# Patient Record
Sex: MALE | Race: WHITE | URBAN - METROPOLITAN AREA
[De-identification: names, ages, dates, MRNs, and addresses within clinical notes are randomized per-mention and may not be internally consistent; named-entity substitution may affect disease eponyms.]

---

## 2020-08-22 ENCOUNTER — NEW PATIENT (OUTPATIENT)
Dept: URBAN - METROPOLITAN AREA CLINIC 30 | Facility: CLINIC | Age: 50
End: 2020-08-22
Payer: COMMERCIAL

## 2020-08-22 DIAGNOSIS — H43.393 OTHER VITREOUS OPACITIES, BILATERAL: ICD-10-CM

## 2020-08-22 DIAGNOSIS — E11.39 TYPE 2 DIABETES MELLITUS WITH OPHTHALMIC COMPLICAT: Primary | ICD-10-CM

## 2020-08-22 DIAGNOSIS — Z79.84 LONG TERM (CURRENT) USE OF ORAL ANTIDIABETIC DRUGS: ICD-10-CM

## 2020-08-22 DIAGNOSIS — E11.3291 DIABETES MELLITUS TYPE 2 WITH MILD NON-PROLIFERATI: ICD-10-CM

## 2020-08-22 PROCEDURE — 92134 CPTRZ OPH DX IMG PST SGM RTA: CPT | Performed by: OPTOMETRIST

## 2020-08-22 PROCEDURE — 92004 COMPRE OPH EXAM NEW PT 1/>: CPT | Performed by: OPTOMETRIST

## 2020-08-22 ASSESSMENT — VISUAL ACUITY
OD: 20/20
OS: 20/20

## 2020-08-22 ASSESSMENT — KERATOMETRY
OD: 42.30
OS: 42.34

## 2020-08-22 ASSESSMENT — INTRAOCULAR PRESSURE
OS: 18
OD: 17

## 2024-02-09 DIAGNOSIS — Z00.00 HEALTH MAINTENANCE EXAMINATION: ICD-10-CM

## 2024-03-15 ENCOUNTER — LAB (OUTPATIENT)
Dept: LAB | Facility: LAB | Age: 54
End: 2024-03-15
Payer: COMMERCIAL

## 2024-03-15 DIAGNOSIS — E11.649 TYPE 2 DIABETES MELLITUS WITH HYPOGLYCEMIA WITHOUT COMA (MULTI): Primary | ICD-10-CM

## 2024-03-15 LAB
ALBUMIN SERPL BCP-MCNC: 3.5 G/DL (ref 3.4–5)
ALP SERPL-CCNC: 77 U/L (ref 33–120)
ALT SERPL W P-5'-P-CCNC: 17 U/L (ref 10–52)
ANION GAP SERPL CALC-SCNC: 13 MMOL/L (ref 10–20)
AST SERPL W P-5'-P-CCNC: 29 U/L (ref 9–39)
BILIRUB SERPL-MCNC: 1.5 MG/DL (ref 0–1.2)
BUN SERPL-MCNC: 16 MG/DL (ref 6–23)
CALCIUM SERPL-MCNC: 8.6 MG/DL (ref 8.6–10.3)
CHLORIDE SERPL-SCNC: 110 MMOL/L (ref 98–107)
CO2 SERPL-SCNC: 21 MMOL/L (ref 21–32)
CREAT SERPL-MCNC: 1.13 MG/DL (ref 0.5–1.3)
EGFRCR SERPLBLD CKD-EPI 2021: 78 ML/MIN/1.73M*2
EST. AVERAGE GLUCOSE BLD GHB EST-MCNC: 126 MG/DL
GLUCOSE SERPL-MCNC: 115 MG/DL (ref 74–99)
HBA1C MFR BLD: 6 %
POTASSIUM SERPL-SCNC: 4 MMOL/L (ref 3.5–5.3)
PROT SERPL-MCNC: 6.5 G/DL (ref 6.4–8.2)
PTH-INTACT SERPL-MCNC: 25.4 PG/ML (ref 18.5–88)
SODIUM SERPL-SCNC: 140 MMOL/L (ref 136–145)
TSH SERPL DL<=0.05 MIU/L-ACNC: 1.45 MIU/L (ref 0.27–4.2)

## 2024-03-15 PROCEDURE — 83970 ASSAY OF PARATHORMONE: CPT

## 2024-03-15 PROCEDURE — 80053 COMPREHEN METABOLIC PANEL: CPT

## 2024-03-15 PROCEDURE — 84443 ASSAY THYROID STIM HORMONE: CPT

## 2024-03-15 PROCEDURE — 36415 COLL VENOUS BLD VENIPUNCTURE: CPT

## 2024-03-15 PROCEDURE — 83036 HEMOGLOBIN GLYCOSYLATED A1C: CPT

## 2024-03-18 NOTE — PROGRESS NOTES
"Roberto Pierre is a 53 y.o. male presents to [facility] for stress management and resilience training in coordination with  Executive Health. Lives with wife.     Duties/Schedule:  Daily schedule: 6am to 6pm, plans to reduce how much time he's there, but new position. Not exercising, get home, dinner, relax, sleep.   Plan is to reduce work hours in the next 2 months.    Frequency of travel: 75% with small trips in between  Recently relocated from [place]. New position requires hiring/firing, which has been a source of stress.   What matters most to you?   Family (3 grown children)  Hobbies: music, writing, photography, reading (1 book/week) - helps him stay balanced    Current Self-Care/Stress Management Strategy:  What have you tried in the past?   Hobbies  What worked well for you?   Reading  Spending time with wife  Are there any stress management strategies you've seen that you are interested in learning more about?   Exercise, feels would be helpful but finds difficult with current schedule  Supplements/teas?   Peppermint tea and chamomile    Nourishment/Mindful Eating/Digestion:  Breakfast- quick/rush, standing in kitchen  Lunch-eats at desk while working, 1x/week cafeteria or out to eat  Dinner- sitting down to eat, no electronics  Recommended sitting for meals  No electronics, stressful conversations, news, etc. during mealtime  3 deep breaths, feel feet on floor  \"Breathing in I calm my body, breathing out, I smile\" x 3  Feel gratitude for your food  Feel the saliva pool in your mouth prior to taking the first bite.   Mindful eating improves overall digestion.     Caffeine/Alcohol  No alcohol  Occasional coke, no coffee     Restorative Sleep  Bedtime/Wake  Average duration: 6-7 hours, wakes well rested    Sleep onset/maintenance: Falls asleep easily, stays asleep  Nocturia: 0  Bedroom Temp: 72-73  Optimal temperature for sleep is 65 degrees Fahrenheit (18.3 degrees Celsius)  Light sources: Phone, night light  Electronics: " phone, ipad, glo (reads before sleep)  Recommended reducing blue light exposure  Gave up social media with COVID, continues, but does go on LinkedIn, tempted to turn off.    Medications/supplements for sleep?   None    Social Connection  Great marriage, involved in [employer], both traveling and recently moved. Making great connections at work.  Wants to start volunteering - enjoys the arts     Manifestation of Stress:   Body: Back pain- back tenses up. Occurs during difficult conversations.   Mental/Emotional: Become less patient, shorter fuse.       Assessment/Plan:   Mr. Pierre plans to increase exercise as his schedule allows by joining [employer] and swimming. He enjoys many hobbies and finds these to be an effective stress management strategy. He is at time eating while standing or doing other work and may consider breathing prior to eating and being fully present while eating.   Discussed physiologic changes that occur with acute vs. chronic stress, the autonomic nervous system, evidence re: neuroplasticity of mindfulness, and different mindfulness practices. Also discussed lifestyle habits that support the Body's natural defense mechanisms.  Practiced alternate nostril breathing together  Provided information on additional breathing exercises including 4:4 and 4:6 breathing.

## 2024-03-25 ENCOUNTER — HOSPITAL ENCOUNTER (OUTPATIENT)
Dept: RADIOLOGY | Facility: HOSPITAL | Age: 54
Discharge: HOME | End: 2024-03-25
Payer: COMMERCIAL

## 2024-03-25 ENCOUNTER — ALLIED HEALTH (OUTPATIENT)
Dept: INTEGRATIVE MEDICINE | Facility: CLINIC | Age: 54
End: 2024-03-25
Payer: COMMERCIAL

## 2024-03-25 ENCOUNTER — OFFICE VISIT (OUTPATIENT)
Dept: PRIMARY CARE | Facility: CLINIC | Age: 54
End: 2024-03-25
Payer: COMMERCIAL

## 2024-03-25 ENCOUNTER — HOSPITAL ENCOUNTER (OUTPATIENT)
Dept: VASCULAR MEDICINE | Facility: HOSPITAL | Age: 54
Discharge: HOME | End: 2024-03-25
Payer: COMMERCIAL

## 2024-03-25 ENCOUNTER — HOSPITAL ENCOUNTER (OUTPATIENT)
Dept: CARDIOLOGY | Facility: HOSPITAL | Age: 54
Discharge: HOME | End: 2024-03-25
Payer: COMMERCIAL

## 2024-03-25 VITALS
DIASTOLIC BLOOD PRESSURE: 68 MMHG | SYSTOLIC BLOOD PRESSURE: 132 MMHG | HEIGHT: 66 IN | WEIGHT: 170 LBS | OXYGEN SATURATION: 97 % | HEART RATE: 99 BPM | BODY MASS INDEX: 27.32 KG/M2

## 2024-03-25 DIAGNOSIS — Z00.00 HEALTH MAINTENANCE EXAMINATION: ICD-10-CM

## 2024-03-25 DIAGNOSIS — Z00.00 HEALTH MAINTENANCE EXAMINATION: Primary | ICD-10-CM

## 2024-03-25 DIAGNOSIS — R09.89 OTHER SPECIFIED SYMPTOMS AND SIGNS INVOLVING THE CIRCULATORY AND RESPIRATORY SYSTEMS: ICD-10-CM

## 2024-03-25 DIAGNOSIS — Z00.00 HEALTHCARE MAINTENANCE: Primary | ICD-10-CM

## 2024-03-25 DIAGNOSIS — K74.60 CIRRHOSIS OF LIVER WITHOUT ASCITES, UNSPECIFIED HEPATIC CIRRHOSIS TYPE (MULTI): ICD-10-CM

## 2024-03-25 DIAGNOSIS — Z13.6 SCREENING FOR AAA (AORTIC ABDOMINAL ANEURYSM): ICD-10-CM

## 2024-03-25 LAB
25(OH)D3 SERPL-MCNC: 35 NG/ML (ref 30–100)
ALBUMIN SERPL BCP-MCNC: 3.3 G/DL (ref 3.4–5)
ALP SERPL-CCNC: 71 U/L (ref 33–120)
ALT SERPL W P-5'-P-CCNC: 16 U/L (ref 10–52)
ANION GAP SERPL CALC-SCNC: 14 MMOL/L (ref 10–20)
AST SERPL W P-5'-P-CCNC: 27 U/L (ref 9–39)
BASOPHILS # BLD AUTO: 0.02 X10*3/UL (ref 0–0.1)
BASOPHILS NFR BLD AUTO: 0.3 %
BILIRUB SERPL-MCNC: 1.6 MG/DL (ref 0–1.2)
BUN SERPL-MCNC: 13 MG/DL (ref 6–23)
CALCIUM SERPL-MCNC: 7.8 MG/DL (ref 8.6–10.3)
CHLORIDE SERPL-SCNC: 112 MMOL/L (ref 98–107)
CHOLEST SERPL-MCNC: 130 MG/DL (ref 0–199)
CHOLESTEROL/HDL RATIO: 2.5
CO2 SERPL-SCNC: 20 MMOL/L (ref 21–32)
CREAT SERPL-MCNC: 1.12 MG/DL (ref 0.5–1.3)
CRP SERPL HS-MCNC: 4.1 MG/L
EGFRCR SERPLBLD CKD-EPI 2021: 79 ML/MIN/1.73M*2
EOSINOPHIL # BLD AUTO: 0.1 X10*3/UL (ref 0–0.7)
EOSINOPHIL NFR BLD AUTO: 1.7 %
ERYTHROCYTE [DISTWIDTH] IN BLOOD BY AUTOMATED COUNT: 14.8 % (ref 11.5–14.5)
EST. AVERAGE GLUCOSE BLD GHB EST-MCNC: 120 MG/DL
FERRITIN SERPL-MCNC: 28 NG/ML (ref 20–300)
GLUCOSE SERPL-MCNC: 103 MG/DL (ref 74–99)
HBA1C MFR BLD: 5.8 %
HCT VFR BLD AUTO: 40.5 % (ref 41–52)
HDLC SERPL-MCNC: 52.8 MG/DL
HGB BLD-MCNC: 13.3 G/DL (ref 13.5–17.5)
IMM GRANULOCYTES # BLD AUTO: 0.01 X10*3/UL (ref 0–0.7)
IMM GRANULOCYTES NFR BLD AUTO: 0.2 % (ref 0–0.9)
INSULIN P FAST SERPL-ACNC: 14 UIU/ML (ref 3–25)
IRON SATN MFR SERPL: 26 % (ref 25–45)
IRON SERPL-MCNC: 104 UG/DL (ref 35–150)
LDLC SERPL CALC-MCNC: 69 MG/DL
LYMPHOCYTES # BLD AUTO: 0.75 X10*3/UL (ref 1.2–4.8)
LYMPHOCYTES NFR BLD AUTO: 12.8 %
MAGNESIUM SERPL-MCNC: 1.8 MG/DL (ref 1.6–2.4)
MCH RBC QN AUTO: 29 PG (ref 26–34)
MCHC RBC AUTO-ENTMCNC: 32.8 G/DL (ref 32–36)
MCV RBC AUTO: 88 FL (ref 80–100)
MONOCYTES # BLD AUTO: 0.49 X10*3/UL (ref 0.1–1)
MONOCYTES NFR BLD AUTO: 8.4 %
NEUTROPHILS # BLD AUTO: 4.48 X10*3/UL (ref 1.2–7.7)
NEUTROPHILS NFR BLD AUTO: 76.6 %
NON HDL CHOLESTEROL: 77 MG/DL (ref 0–149)
NRBC BLD-RTO: 0 /100 WBCS (ref 0–0)
PLATELET # BLD AUTO: 64 X10*3/UL (ref 150–450)
POC APPEARANCE, URINE: CLEAR
POC BILIRUBIN, URINE: NEGATIVE
POC BLOOD, URINE: NEGATIVE
POC COLOR, URINE: YELLOW
POC GLUCOSE, URINE: ABNORMAL MG/DL
POC KETONES, URINE: NEGATIVE MG/DL
POC LEUKOCYTES, URINE: NEGATIVE
POC NITRITE,URINE: NEGATIVE
POC PH, URINE: 6.5 PH
POC PROTEIN, URINE: NEGATIVE MG/DL
POC SPECIFIC GRAVITY, URINE: 1.02
POC UROBILINOGEN, URINE: 0.2 EU/DL
POTASSIUM SERPL-SCNC: 4 MMOL/L (ref 3.5–5.3)
PROT SERPL-MCNC: 6.5 G/DL (ref 6.4–8.2)
PSA SERPL-MCNC: 1.15 NG/ML
RBC # BLD AUTO: 4.58 X10*6/UL (ref 4.5–5.9)
SODIUM SERPL-SCNC: 142 MMOL/L (ref 136–145)
TIBC SERPL-MCNC: 399 UG/DL (ref 240–445)
TRIGL SERPL-MCNC: 43 MG/DL (ref 0–149)
TSH SERPL-ACNC: 0.93 MIU/L (ref 0.44–3.98)
UIBC SERPL-MCNC: 295 UG/DL (ref 110–370)
URATE SERPL-MCNC: 3.2 MG/DL (ref 4–7.5)
VIT B12 SERPL-MCNC: 472 PG/ML (ref 211–911)
VLDL: 9 MG/DL (ref 0–40)
WBC # BLD AUTO: 5.9 X10*3/UL (ref 4.4–11.3)

## 2024-03-25 PROCEDURE — 76706 US ABDL AORTA SCREEN AAA: CPT

## 2024-03-25 PROCEDURE — 93018 CV STRESS TEST I&R ONLY: CPT | Performed by: INTERNAL MEDICINE

## 2024-03-25 PROCEDURE — 36415 COLL VENOUS BLD VENIPUNCTURE: CPT

## 2024-03-25 PROCEDURE — 83735 ASSAY OF MAGNESIUM: CPT

## 2024-03-25 PROCEDURE — EXAM4 EXAM 4: Performed by: INTERNAL MEDICINE

## 2024-03-25 PROCEDURE — 86141 C-REACTIVE PROTEIN HS: CPT

## 2024-03-25 PROCEDURE — 84153 ASSAY OF PSA TOTAL: CPT

## 2024-03-25 PROCEDURE — 81002 URINALYSIS NONAUTO W/O SCOPE: CPT | Performed by: INTERNAL MEDICINE

## 2024-03-25 PROCEDURE — 82306 VITAMIN D 25 HYDROXY: CPT

## 2024-03-25 PROCEDURE — 82728 ASSAY OF FERRITIN: CPT

## 2024-03-25 PROCEDURE — 85025 COMPLETE CBC W/AUTO DIFF WBC: CPT

## 2024-03-25 PROCEDURE — 83540 ASSAY OF IRON: CPT

## 2024-03-25 PROCEDURE — 93017 CV STRESS TEST TRACING ONLY: CPT

## 2024-03-25 PROCEDURE — 83036 HEMOGLOBIN GLYCOSYLATED A1C: CPT

## 2024-03-25 PROCEDURE — 83550 IRON BINDING TEST: CPT

## 2024-03-25 PROCEDURE — 71046 X-RAY EXAM CHEST 2 VIEWS: CPT

## 2024-03-25 PROCEDURE — 75571 CT HRT W/O DYE W/CA TEST: CPT

## 2024-03-25 PROCEDURE — 84550 ASSAY OF BLOOD/URIC ACID: CPT

## 2024-03-25 PROCEDURE — 84443 ASSAY THYROID STIM HORMONE: CPT

## 2024-03-25 PROCEDURE — 93880 EXTRACRANIAL BILAT STUDY: CPT

## 2024-03-25 PROCEDURE — 83525 ASSAY OF INSULIN: CPT

## 2024-03-25 PROCEDURE — 76706 US ABDL AORTA SCREEN AAA: CPT | Performed by: INTERNAL MEDICINE

## 2024-03-25 PROCEDURE — 82172 ASSAY OF APOLIPOPROTEIN: CPT

## 2024-03-25 PROCEDURE — 93016 CV STRESS TEST SUPVJ ONLY: CPT | Performed by: INTERNAL MEDICINE

## 2024-03-25 PROCEDURE — 93880 EXTRACRANIAL BILAT STUDY: CPT | Performed by: INTERNAL MEDICINE

## 2024-03-25 PROCEDURE — 84402 ASSAY OF FREE TESTOSTERONE: CPT

## 2024-03-25 PROCEDURE — 80053 COMPREHEN METABOLIC PANEL: CPT

## 2024-03-25 PROCEDURE — 80061 LIPID PANEL: CPT

## 2024-03-25 PROCEDURE — 82607 VITAMIN B-12: CPT

## 2024-03-25 RX ORDER — PANTOPRAZOLE SODIUM 40 MG/1
TABLET, DELAYED RELEASE ORAL
COMMUNITY
Start: 2024-01-02

## 2024-03-25 RX ORDER — DULAGLUTIDE 3 MG/.5ML
3 INJECTION, SOLUTION SUBCUTANEOUS
COMMUNITY
Start: 2024-03-01

## 2024-03-25 RX ORDER — EMPAGLIFLOZIN 25 MG/1
25 TABLET, FILM COATED ORAL DAILY
COMMUNITY
Start: 2024-03-01

## 2024-03-25 RX ORDER — FLASH GLUCOSE SENSOR
KIT MISCELLANEOUS
COMMUNITY
Start: 2023-05-11

## 2024-03-25 RX ORDER — FENOFIBRATE 145 MG/1
145 TABLET, FILM COATED ORAL DAILY
COMMUNITY
Start: 2024-02-28

## 2024-03-25 ASSESSMENT — PAIN SCALES - GENERAL: PAINLEVEL: 0-NO PAIN

## 2024-03-25 NOTE — PROGRESS NOTES
Executive Physical         Patient ID: Roberto Pierre is a 53 y.o. male who presents for Executive Evaluation:    The following report is in reference to your  executive physical examination which was held at [facility] on 03/25/24. Firstly, let me state that it was a pleasure meeting with you and that we appreciate you coming to [facility] for your executive evaluation.    At the time of your evaluation you were feeling well with no significant health concerns.    You were not complaining of fever ,chills, headache ,dizziness ,cough, chest pain shortness of breath, palpitations, nausea, vomiting, abdominal pain, loss of appetite, diarrhea, blood in the stools, frequent urination or painful urination.    Past Medical History:   Diagnosis Date    Cirrhosis of liver (CMS/HCC)     Diabetes (CMS/HCC)     Kidney stone          Review of Systems   Constitutional: Negative.    HENT: Negative.     Eyes: Negative.    Respiratory: Negative.     Cardiovascular: Negative.    Gastrointestinal: Negative.    Endocrine: Negative.    Genitourinary: Negative.    Skin: Negative.    Allergic/Immunologic: Negative.    Neurological: Negative.    Hematological: Negative.    Psychiatric/Behavioral: Negative.     All other systems reviewed and are negative.             Past Surgical History:   Procedure Laterality Date    TRIGGER FINGER RELEASE          Family History   Problem Relation Name Age of Onset    Cirrhosis Mother      Breast cancer Sister      Sleep apnea Brother          No Known Allergies       Current Outpatient Medications:     fenofibrate (Tricor) 145 mg tablet, Take 1 tablet (145 mg) by mouth once daily., Disp: , Rfl:     FreeStyle Malvin 2 Sensor kit, CHANGEEVERY 14 DAYS, Disp: , Rfl:     Jardiance 25 mg, Take 1 tablet (25 mg) by mouth once daily., Disp: , Rfl:     pantoprazole (ProtoNix) 40 mg EC tablet, TAKE 1 TAB BY MOUTH EVERY MORNING, Disp: , Rfl:      "Trulicity 3 mg/0.5 mL pen injector, Inject 3 mg under the skin 1 (one) time per week., Disp: , Rfl:      Visit Vitals  /68 (BP Location: Left arm, Patient Position: Sitting, BP Cuff Size: Large adult)   Pulse 99   Ht 1.676 m (5' 6\")   Wt 77.1 kg (170 lb)   SpO2 97%   BMI 27.44 kg/m²   Smoking Status Never   BSA 1.89 m²        Physical Exam  Constitutional:       Appearance: Normal appearance.   HENT:      Head: Normocephalic and atraumatic.      Right Ear: Tympanic membrane, ear canal and external ear normal.      Left Ear: Tympanic membrane, ear canal and external ear normal.      Nose: Nose normal.      Mouth/Throat:      Mouth: Mucous membranes are moist.   Eyes:      Extraocular Movements: Extraocular movements intact.      Conjunctiva/sclera: Conjunctivae normal.      Pupils: Pupils are equal, round, and reactive to light.   Cardiovascular:      Rate and Rhythm: Normal rate and regular rhythm.      Pulses: Normal pulses.      Heart sounds: Normal heart sounds.   Pulmonary:      Effort: Pulmonary effort is normal.      Breath sounds: Normal breath sounds.   Abdominal:      General: Abdomen is flat. Bowel sounds are normal.      Palpations: Abdomen is soft.      Comments: Variceal in abdomen and R groin region.   Genitourinary:     Rectum: Normal.   Musculoskeletal:         General: Normal range of motion.      Cervical back: Normal range of motion.   Skin:     General: Skin is warm.   Neurological:      General: No focal deficit present.      Mental Status: He is alert and oriented to person, place, and time.   Psychiatric:         Mood and Affect: Mood normal.         Behavior: Behavior normal.         Discussion/Summary  In summary you are 53 y.o. male with a past medical history of liver cirrhosis, DM and kidney stones. .  At the time of your evaluation you were feeling well with no significant health concerns.    Physical examination including blood pressure and cardiovascular examination was " unremarkable. Body mass index was elevated at 27.    Lab studies including  fasting lipid panel, thyroid function, Prostate specific antigen (PSA), Vitamin D, Vitamin B12, Insulin, Testosterone  and inflammatory markers were normal.      Low Platelets    Low Calcium/albumin levels.    Cardiology- Normal EKG, CT-Cardiac score, Exercise stress test, Carotid and Abdominal Aorta ultrasound studies.     The 10-year coronary artery disease risk is < 4% which is a very low risk for heart disease and strokes.     Elevated BMI= 27  ( 19-25) - Increase protein/fish/fiber/fruit/vegetables/water  whole grain intake  and limit processed/refined carbohydrates and added sugars. Increased physical activity to a minimum of 150 minutes of moderate exercise per week.      DM2- Recent weight loss of 30 pounds d/t portion control. Continue with Current Rx. HBA1C =6.0     Low platelets- d/t liver cirrhosis and portal hypertension. No platelet therapy is needed at this time. Please call if nose or gum bleeds occur or if uncontrolled bleeding episodes occur.    Liver cirrhosis- ? Familial ? Idiopathic. Recommend fibroscan and  hepatology consult at  for further evaluation & management.    Low albumin & calcium levels- most likely d/t liver cirrhosis and portal hypertension.    Skin - Please follow up with dermatology for annual examination.Please use a broad-spectrum sunscreen with an SPF of 30 or higher. Monitor moles for ABCDE ( asymmetry, border irregularity, color changes, diameter> pencil eraser and evolving )    Cancer screening- you are current with colonoscopy,prostate and lung cancer screening tests.    Vaccine- I would  recommend a shingles (Shingrix) vaccine at your earliest convenience.  In addition I would recommend a tetanus booster every 10 years to maintain immunity.  Recommend pneumonia vaccine (Prevnar 20)  at your earliest opportunity given your diagnosis of liver cirrhosis.  Consider COVID-19 booster and flu shot next  fall.      Wellness: Please continue with a balanced diet and a regular physical activity program for at least 150 minutes/week of moderate exercise and 30 minutes/week of resistance/weight training per week.  Try to get 6 to 8 hours of sleep per night.  Please download the calm or headspace brittani from the Brittani Store to assist with stress and sleep management if necessary.    In conclusion,  I wish to thank you for attending the [facility] health program at [facility].  I wish you and your family a safe and healthy Spring  season.    Please email me at [email or web address] or call me at [phone] if you have any questions pertaining to this report or any other medical concerns.    Be Well    Dr MARILYN Cedeno and Melinda Cabrera Master Clinician in Wellness    Senior Attending Physician , [facility] [place]    [facility]    Clinical     [facility] [employer]    [place], [place]    This note was partially generated using the Dragon voice recognition system.  There may be some incorrect wording ,grammar, spelling or punctuation errors that were not corrected prior to committing the note to the medical record.

## 2024-03-27 LAB — LPA SERPL-MCNC: 12 MG/DL

## 2024-03-31 LAB
TESTOSTERONE FREE (CHAN): 53.6 PG/ML (ref 35–155)
TESTOSTERONE,TOTAL,LC-MS/MS: 837 NG/DL (ref 250–1100)

## 2024-04-14 ASSESSMENT — ENCOUNTER SYMPTOMS
NEUROLOGICAL NEGATIVE: 1
PSYCHIATRIC NEGATIVE: 1
CONSTITUTIONAL NEGATIVE: 1
EYES NEGATIVE: 1
ALLERGIC/IMMUNOLOGIC NEGATIVE: 1
GASTROINTESTINAL NEGATIVE: 1
CARDIOVASCULAR NEGATIVE: 1
RESPIRATORY NEGATIVE: 1
ENDOCRINE NEGATIVE: 1
HEMATOLOGIC/LYMPHATIC NEGATIVE: 1

## 2024-06-17 ENCOUNTER — CLINICAL SUPPORT (OUTPATIENT)
Dept: GASTROENTEROLOGY | Facility: CLINIC | Age: 54
End: 2024-06-17
Payer: COMMERCIAL

## 2024-06-17 DIAGNOSIS — K74.60 CIRRHOSIS OF LIVER WITHOUT ASCITES, UNSPECIFIED HEPATIC CIRRHOSIS TYPE (MULTI): Primary | ICD-10-CM

## 2024-06-17 DIAGNOSIS — K74.60 CIRRHOSIS OF LIVER WITHOUT ASCITES, UNSPECIFIED HEPATIC CIRRHOSIS TYPE (MULTI): ICD-10-CM

## 2024-06-17 PROCEDURE — 91200 LIVER ELASTOGRAPHY: CPT | Performed by: INTERNAL MEDICINE

## 2024-08-27 PROBLEM — E11.649 TYPE 2 DIABETES MELLITUS WITH HYPOGLYCEMIA WITHOUT COMA (MULTI): Status: ACTIVE | Noted: 2024-03-15

## 2024-08-27 PROBLEM — R09.89 OTHER SPECIFIED SYMPTOMS AND SIGNS INVOLVING THE CIRCULATORY AND RESPIRATORY SYSTEMS: Status: ACTIVE | Noted: 2024-08-27

## 2024-09-05 ENCOUNTER — OFFICE VISIT (OUTPATIENT)
Dept: GASTROENTEROLOGY | Facility: CLINIC | Age: 54
End: 2024-09-05
Payer: COMMERCIAL

## 2024-09-05 VITALS
BODY MASS INDEX: 28.12 KG/M2 | TEMPERATURE: 97.5 F | HEART RATE: 80 BPM | WEIGHT: 175 LBS | HEIGHT: 66 IN | DIASTOLIC BLOOD PRESSURE: 68 MMHG | SYSTOLIC BLOOD PRESSURE: 131 MMHG | OXYGEN SATURATION: 99 %

## 2024-09-05 DIAGNOSIS — K74.60 CIRRHOSIS OF LIVER WITHOUT ASCITES, UNSPECIFIED HEPATIC CIRRHOSIS TYPE (MULTI): Primary | ICD-10-CM

## 2024-09-05 DIAGNOSIS — Z76.89 ENCOUNTER TO ESTABLISH CARE WITH NEW DOCTOR: ICD-10-CM

## 2024-09-05 DIAGNOSIS — K76.0 METABOLIC DYSFUNCTION-ASSOCIATED STEATOTIC LIVER DISEASE (MASLD): ICD-10-CM

## 2024-09-05 DIAGNOSIS — K76.6 PORTAL HYPERTENSION (MULTI): ICD-10-CM

## 2024-09-05 DIAGNOSIS — D69.6 THROMBOCYTOPENIA (CMS-HCC): ICD-10-CM

## 2024-09-05 PROCEDURE — 3048F LDL-C <100 MG/DL: CPT | Performed by: INTERNAL MEDICINE

## 2024-09-05 PROCEDURE — 3078F DIAST BP <80 MM HG: CPT | Performed by: INTERNAL MEDICINE

## 2024-09-05 PROCEDURE — 99205 OFFICE O/P NEW HI 60 MIN: CPT | Performed by: INTERNAL MEDICINE

## 2024-09-05 PROCEDURE — 3044F HG A1C LEVEL LT 7.0%: CPT | Performed by: INTERNAL MEDICINE

## 2024-09-05 PROCEDURE — 3075F SYST BP GE 130 - 139MM HG: CPT | Performed by: INTERNAL MEDICINE

## 2024-09-05 PROCEDURE — 99215 OFFICE O/P EST HI 40 MIN: CPT | Performed by: INTERNAL MEDICINE

## 2024-09-05 RX ORDER — CARVEDILOL 6.25 MG/1
6.25 TABLET ORAL
Qty: 60 TABLET | Refills: 11 | Status: SHIPPED | OUTPATIENT
Start: 2024-09-05 | End: 2025-09-05

## 2024-09-05 ASSESSMENT — PAIN SCALES - GENERAL: PAINLEVEL: 0-NO PAIN

## 2024-09-05 NOTE — PATIENT INSTRUCTIONS
Welcome to Dr. Felton Feldman's [facility].  Dr. Feldman sees patients at the following sites:  [place] [address] Liver/GI Clinic at [facility]  [address], [address] at [facility] at [facility], [facility] [address]    Dr. Feldman's hepatology care coordinator, Bettie HILLS, can be reached at [phone].  Dr. Feldman's , Darlene Sanchez, can be reached at [phone].     We are going to request records from your GI in [place].   I will review your records and determine if any additional blood work is needed.    Get a Liver Ultrasound.  Do not eat or drink anything 6 hours prior to your exam.  Call [phone] to schedule.     I am starting you on Carvedilol for Portal Hypertension.   Your dose will be 6.25 mg.   Start by taking 1 pill a day for the first week,   Please monitor your blood pressure and heart rate. Please call, or send a [employer] message with your BP/HR log.   If stable, then will increase to twice daily.    Follow up with me in clinic in 6 months.  Schedule your visit on your way out today. If unable, please call [phone] to schedule.

## 2024-09-05 NOTE — PROGRESS NOTES
"Subjective     Evaluation and management of cirrhosis.    History of Present Illness:   Roberto Pierre is a 53 y.o. male who presents to the [facility] at [facility] to establish his liver care.    He is referred by his primary care physician Dr. Amando Amaya.    Roberto or \"Anand\" moved to the [place] area last year.  He relocated from [place] with his wife, he was first diagnosed with a \"fatty liver\" in 2010 and cirrhosis in 2017.  He has been under the care of gastroenterologist in the past.    He explains that he was never surprised that he was diagnosed with liver disease.  He has a mother with a history of cirrhosis who underwent liver transplant in her 50s at the [facility] in [facility].  Her liver transplant was in 2003, over 20 years ago.  She is doing quite well.  Since her liver transplant she is also had a kidney transplant.    She also explains that he has 2 brothers -with fatty liver disease.  His older brother is also cirrhotic.    He denies any major complications from his liver disease.  He does recall having an endoscopy and banding of varices in the past.    He has never been jaundice.  He has never developed fluid overload symptoms or ascites.  He has never had any confusion symptoms.  He works in senior management at a high level.    Review of Systems  Review of Systems  Please refer to the new patient questionnaire for self-reported review of systems.    He has made a concerted effort to lose weight over the years.  He says it is he has lost about 30 pounds in total through diet and exercise.    Past Medical History   has a past medical history of Cirrhosis of liver (Multi), Diabetes (Multi), and Kidney stone.   Past medical history is significant for diabetes, which appears to be fairly well-controlled.  He takes Trulicity and Jardiance.    He also has mixed hyperlipidemia and takes fenofibrate for that.    He denies any surgical history.    I conducted a full medication " "review; denies any allergies to medications.    Social History   reports that he has never smoked. He does not have any smokeless tobacco history on file. He reports that he does not currently use alcohol.     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  He is .  He was previously living in [facility].    He denies cigarette smoking.  Denies alcohol use.  He denies any recreational drug use.    Family History  family history includes Breast cancer in his sister; Cirrhosis in his mother; Sleep apnea in his brother.     He has a significant family history of MASLD and cirrhosis as noted in the history of present illness.    Allergies  No Known Allergies    Medications  Current Outpatient Medications   Medication Instructions    fenofibrate (TRICOR) 145 mg, oral, Daily    FreeStyle Malvin 2 Sensor kit CHANGEEVERY 14 DAYS    Jardiance 25 mg, oral, Daily    pantoprazole (ProtoNix) 40 mg EC tablet TAKE 1 TAB BY MOUTH EVERY MORNING    Trulicity 3 mg, subcutaneous, Once Weekly        Objective   Visit Vitals  /68   Pulse 80   Temp 36.4 °C (97.5 °F)          3/25/2024     8:34 AM 9/5/2024     3:42 PM   Vitals   Systolic 132 131   Diastolic 68 68   Heart Rate 99 80   Temp  36.4 °C (97.5 °F)   Height (in) 1.676 m (5' 6\") 1.676 m (5' 6\")   Weight (lb) 170 175   BMI 27.44 kg/m2 28.25 kg/m2   BSA (m2) 1.89 m2 1.92 m2   Visit Report Report Report     Physical Exam  Vitals and nursing note reviewed.   Constitutional:       Appearance: Normal appearance.   HENT:      Head: Normocephalic and atraumatic.      Mouth/Throat:      Mouth: Mucous membranes are moist.      Pharynx: Oropharynx is clear.   Eyes:      General: No scleral icterus.     Extraocular Movements: Extraocular movements intact.      Pupils: Pupils are equal, round, and reactive to light.   Cardiovascular:      Rate and Rhythm: Normal rate and regular rhythm.      Heart sounds: No murmur heard.  Pulmonary:      " Effort: Pulmonary effort is normal.      Breath sounds: Normal breath sounds.   Abdominal:      General: Abdomen is flat. Bowel sounds are normal.      Palpations: Abdomen is soft. There is splenomegaly.      Comments: Hard liver edge.   Musculoskeletal:         General: No swelling. Normal range of motion.      Right lower leg: No edema.      Left lower leg: No edema.   Skin:     Coloration: Skin is not jaundiced.   Neurological:      General: No focal deficit present.      Mental Status: He is alert and oriented to person, place, and time. Mental status is at baseline.   Psychiatric:         Mood and Affect: Mood normal.         Thought Content: Thought content normal.         Judgment: Judgment normal.     Labs    WBC   Date/Time Value Ref Range Status   03/25/2024 09:08 AM 5.9 4.4 - 11.3 x10*3/uL Final     Hemoglobin   Date/Time Value Ref Range Status   03/25/2024 09:08 AM 13.3 (L) 13.5 - 17.5 g/dL Final     Hematocrit   Date/Time Value Ref Range Status   03/25/2024 09:08 AM 40.5 (L) 41.0 - 52.0 % Final     MCV   Date/Time Value Ref Range Status   03/25/2024 09:08 AM 88 80 - 100 fL Final     Platelets   Date/Time Value Ref Range Status   03/25/2024 09:08 AM 64 (L) 150 - 450 x10*3/uL Final     Comment:     Platelet count verified by smear review.        Total Protein   Date/Time Value Ref Range Status   03/25/2024 09:08 AM 6.5 6.4 - 8.2 g/dL Final     Albumin   Date/Time Value Ref Range Status   03/25/2024 09:08 AM 3.3 (L) 3.4 - 5.0 g/dL Final     AST   Date/Time Value Ref Range Status   03/25/2024 09:08 AM 27 9 - 39 U/L Final     ALT   Date/Time Value Ref Range Status   03/25/2024 09:08 AM 16 10 - 52 U/L Final     Comment:     Patients treated with Sulfasalazine may generate falsely decreased results for ALT.     Alkaline Phosphatase   Date/Time Value Ref Range Status   03/25/2024 09:08 AM 71 33 - 120 U/L Final     Bilirubin, Total   Date/Time Value Ref Range Status   03/25/2024 09:08 AM 1.6 (H) 0.0 - 1.2 mg/dL  Final        Vitamin D, 25-Hydroxy, Total   Date/Time Value Ref Range Status   03/25/2024 09:08 AM 35 30 - 100 ng/mL Final        AST   Date/Time Value Ref Range Status   03/25/2024 09:08 AM 27 9 - 39 U/L Final     ALT   Date/Time Value Ref Range Status   03/25/2024 09:08 AM 16 10 - 52 U/L Final     Comment:     Patients treated with Sulfasalazine may generate falsely decreased results for ALT.     Alkaline Phosphatase   Date/Time Value Ref Range Status   03/25/2024 09:08 AM 71 33 - 120 U/L Final     Bilirubin, Total   Date/Time Value Ref Range Status   03/25/2024 09:08 AM 1.6 (H) 0.0 - 1.2 mg/dL Final     Albumin   Date/Time Value Ref Range Status   03/25/2024 09:08 AM 3.3 (L) 3.4 - 5.0 g/dL Final     Total Protein   Date/Time Value Ref Range Status   03/25/2024 09:08 AM 6.5 6.4 - 8.2 g/dL Final     FIBROSCAN:     6/17/2024    Liver stiffness measurement of 27.5 kPa and a CAP score of 243  Assessment/Plan   Roberto Pierre is a 53 y.o. male who presents to hepatology clinic to establish his liver care.    He has MASLD and cirrhosis by history.    His liver function appears to be intact.  I do not have an INR to calculate his MELD score.  He appears to have compensated advanced chronic liver disease and cirrhosis.    He reports a history of esophageal varices this would be consistent with his thrombocytopenia and splenomegaly on physical exam as well as his FibroScan result of 27.5 kPa.    Plan:    Most importantly, I would like to review his prior records.  We will send off a records request to his former gastroenterologist Dr. Genaro August, [place].    Based on my review of his records I will determine if he needs any further workup for his liver disease and family history of liver disease.  I would favor at the very least sending off a hemochromatosis genetic test, alpha 1 antitrypsin genotyping/phenotyping and screening test for Dave's disease with a ceruloplasmin.    I have recommended liver cancer screening with  a liver ultrasound.  I have also recommended that he starts carvedilol for primary prevention against variceal bleeding and to prevent progression of his portal hypertension.  I believe this is indicated simply based on his low platelet count and elevated liver stiffness measurement.    He should follow my clinic every 6 months    Instructions      Felton Feldman MD

## 2024-09-05 NOTE — LETTER
"September 11, 2024     Amando Amaya MD  [address] [place], [facility] [address]  [facility] [address]    Patient: Roberto Pierre   YOB: 1970   Date of Visit: 9/5/2024       Dear Dr. Amando Amaya MD:    Thank you for referring Roberto Pierre to me for evaluation. Below are my notes for this consultation.  If you have questions, please do not hesitate to call me. I look forward to following your patient along with you.       Sincerely,     Felton Feldman MD      CC: No Recipients  ______________________________________________________________________________________    Subjective     Evaluation and management of cirrhosis.    History of Present Illness:   Roberto Pierre is a 53 y.o. male who presents to the [facility] at [facility] to establish his liver care.    He is referred by his primary care physician Dr. Amando Amaya.    Roberto or \"Anand\" moved to the [place] area last year.  He relocated from [place] with his wife, he was first diagnosed with a \"fatty liver\" in 2010 and cirrhosis in 2017.  He has been under the care of gastroenterologist in the past.    He explains that he was never surprised that he was diagnosed with liver disease.  He has a mother with a history of cirrhosis who underwent liver transplant in her 50s at the [facility] in [facility].  Her liver transplant was in 2003, over 20 years ago.  She is doing quite well.  Since her liver transplant she is also had a kidney transplant.    She also explains that he has 2 brothers -with fatty liver disease.  His older brother is also cirrhotic.    He denies any major complications from his liver disease.  He does recall having an endoscopy and banding of varices in the past.    He has never been jaundice.  He has never developed fluid overload symptoms or ascites.  He has never had any confusion symptoms.  He works in senior management at a high level.    Review of Systems  Review of Systems  Please refer to the " "new patient questionnaire for self-reported review of systems.    He has made a concerted effort to lose weight over the years.  He says it is he has lost about 30 pounds in total through diet and exercise.    Past Medical History   has a past medical history of Cirrhosis of liver (Multi), Diabetes (Multi), and Kidney stone.   Past medical history is significant for diabetes, which appears to be fairly well-controlled.  He takes Trulicity and Jardiance.    He also has mixed hyperlipidemia and takes fenofibrate for that.    He denies any surgical history.    I conducted a full medication review; denies any allergies to medications.    Social History   reports that he has never smoked. He does not have any smokeless tobacco history on file. He reports that he does not currently use alcohol.     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  He is .  He was previously living in [facility].    He denies cigarette smoking.  Denies alcohol use.  He denies any recreational drug use.    Family History  family history includes Breast cancer in his sister; Cirrhosis in his mother; Sleep apnea in his brother.     He has a significant family history of MASLD and cirrhosis as noted in the history of present illness.    Allergies  No Known Allergies    Medications  Current Outpatient Medications   Medication Instructions   • fenofibrate (TRICOR) 145 mg, oral, Daily   • FreeStyle Malvin 2 Sensor kit CHANGEEVERY 14 DAYS   • Jardiance 25 mg, oral, Daily   • pantoprazole (ProtoNix) 40 mg EC tablet TAKE 1 TAB BY MOUTH EVERY MORNING   • Trulicity 3 mg, subcutaneous, Once Weekly        Objective   Visit Vitals  /68   Pulse 80   Temp 36.4 °C (97.5 °F)          3/25/2024     8:34 AM 9/5/2024     3:42 PM   Vitals   Systolic 132 131   Diastolic 68 68   Heart Rate 99 80   Temp  36.4 °C (97.5 °F)   Height (in) 1.676 m (5' 6\") 1.676 m (5' 6\")   Weight (lb) 170 175   BMI 27.44 kg/m2 28.25 " kg/m2   BSA (m2) 1.89 m2 1.92 m2   Visit Report Report Report     Physical Exam  Vitals and nursing note reviewed.   Constitutional:       Appearance: Normal appearance.   HENT:      Head: Normocephalic and atraumatic.      Mouth/Throat:      Mouth: Mucous membranes are moist.      Pharynx: Oropharynx is clear.   Eyes:      General: No scleral icterus.     Extraocular Movements: Extraocular movements intact.      Pupils: Pupils are equal, round, and reactive to light.   Cardiovascular:      Rate and Rhythm: Normal rate and regular rhythm.      Heart sounds: No murmur heard.  Pulmonary:      Effort: Pulmonary effort is normal.      Breath sounds: Normal breath sounds.   Abdominal:      General: Abdomen is flat. Bowel sounds are normal.      Palpations: Abdomen is soft. There is splenomegaly.      Comments: Hard liver edge.   Musculoskeletal:         General: No swelling. Normal range of motion.      Right lower leg: No edema.      Left lower leg: No edema.   Skin:     Coloration: Skin is not jaundiced.   Neurological:      General: No focal deficit present.      Mental Status: He is alert and oriented to person, place, and time. Mental status is at baseline.   Psychiatric:         Mood and Affect: Mood normal.         Thought Content: Thought content normal.         Judgment: Judgment normal.     Labs    WBC   Date/Time Value Ref Range Status   03/25/2024 09:08 AM 5.9 4.4 - 11.3 x10*3/uL Final     Hemoglobin   Date/Time Value Ref Range Status   03/25/2024 09:08 AM 13.3 (L) 13.5 - 17.5 g/dL Final     Hematocrit   Date/Time Value Ref Range Status   03/25/2024 09:08 AM 40.5 (L) 41.0 - 52.0 % Final     MCV   Date/Time Value Ref Range Status   03/25/2024 09:08 AM 88 80 - 100 fL Final     Platelets   Date/Time Value Ref Range Status   03/25/2024 09:08 AM 64 (L) 150 - 450 x10*3/uL Final     Comment:     Platelet count verified by smear review.        Total Protein   Date/Time Value Ref Range Status   03/25/2024 09:08 AM 6.5  6.4 - 8.2 g/dL Final     Albumin   Date/Time Value Ref Range Status   03/25/2024 09:08 AM 3.3 (L) 3.4 - 5.0 g/dL Final     AST   Date/Time Value Ref Range Status   03/25/2024 09:08 AM 27 9 - 39 U/L Final     ALT   Date/Time Value Ref Range Status   03/25/2024 09:08 AM 16 10 - 52 U/L Final     Comment:     Patients treated with Sulfasalazine may generate falsely decreased results for ALT.     Alkaline Phosphatase   Date/Time Value Ref Range Status   03/25/2024 09:08 AM 71 33 - 120 U/L Final     Bilirubin, Total   Date/Time Value Ref Range Status   03/25/2024 09:08 AM 1.6 (H) 0.0 - 1.2 mg/dL Final        Vitamin D, 25-Hydroxy, Total   Date/Time Value Ref Range Status   03/25/2024 09:08 AM 35 30 - 100 ng/mL Final        AST   Date/Time Value Ref Range Status   03/25/2024 09:08 AM 27 9 - 39 U/L Final     ALT   Date/Time Value Ref Range Status   03/25/2024 09:08 AM 16 10 - 52 U/L Final     Comment:     Patients treated with Sulfasalazine may generate falsely decreased results for ALT.     Alkaline Phosphatase   Date/Time Value Ref Range Status   03/25/2024 09:08 AM 71 33 - 120 U/L Final     Bilirubin, Total   Date/Time Value Ref Range Status   03/25/2024 09:08 AM 1.6 (H) 0.0 - 1.2 mg/dL Final     Albumin   Date/Time Value Ref Range Status   03/25/2024 09:08 AM 3.3 (L) 3.4 - 5.0 g/dL Final     Total Protein   Date/Time Value Ref Range Status   03/25/2024 09:08 AM 6.5 6.4 - 8.2 g/dL Final     FIBROSCAN:     6/17/2024    Liver stiffness measurement of 27.5 kPa and a CAP score of 243  Assessment/Plan   Roberto Pierre is a 53 y.o. male who presents to hepatology clinic to establish his liver care.    He has MASLD and cirrhosis by history.    His liver function appears to be intact.  I do not have an INR to calculate his MELD score.  He appears to have compensated advanced chronic liver disease and cirrhosis.    He reports a history of esophageal varices this would be consistent with his thrombocytopenia and splenomegaly on  physical exam as well as his FibroScan result of 27.5 kPa.    Plan:    Most importantly, I would like to review his prior records.  We will send off a records request to his former gastroenterologist Dr. Genaro August, [place].    Based on my review of his records I will determine if he needs any further workup for his liver disease and family history of liver disease.  I would favor at the very least sending off a hemochromatosis genetic test, alpha 1 antitrypsin genotyping/phenotyping and screening test for Dave's disease with a ceruloplasmin.    I have recommended liver cancer screening with a liver ultrasound.  I have also recommended that he starts carvedilol for primary prevention against variceal bleeding and to prevent progression of his portal hypertension.  I believe this is indicated simply based on his low platelet count and elevated liver stiffness measurement.    He should follow my clinic every 6 months    Instructions      Felton Feldman MD

## 2024-09-06 ENCOUNTER — DOCUMENTATION (OUTPATIENT)
Dept: GASTROENTEROLOGY | Facility: HOSPITAL | Age: 54
End: 2024-09-06
Payer: COMMERCIAL

## 2024-09-06 NOTE — PROGRESS NOTES
Hepatology Nurse Coordinator Note  Sent JUAQUIN for patient to Lily, requesting for patient's GI records to be obtained for Dr. Feldman from:  Dr. Sidney HOLLAND August [facility].   [address] [place] [address] [address]  [place] [address]   [phone]  F: [phone]  All GI records.     Awaiting records.

## 2024-09-18 ENCOUNTER — HOSPITAL ENCOUNTER (OUTPATIENT)
Dept: RADIOLOGY | Facility: HOSPITAL | Age: 54
Discharge: HOME | End: 2024-09-18
Payer: COMMERCIAL

## 2024-09-18 DIAGNOSIS — K76.0 METABOLIC DYSFUNCTION-ASSOCIATED STEATOTIC LIVER DISEASE (MASLD): ICD-10-CM

## 2024-09-18 DIAGNOSIS — K76.6 PORTAL HYPERTENSION (MULTI): ICD-10-CM

## 2024-09-18 DIAGNOSIS — K74.60 CIRRHOSIS OF LIVER WITHOUT ASCITES, UNSPECIFIED HEPATIC CIRRHOSIS TYPE (MULTI): ICD-10-CM

## 2024-09-18 PROCEDURE — 76705 ECHO EXAM OF ABDOMEN: CPT | Performed by: RADIOLOGY

## 2024-09-18 PROCEDURE — 76705 ECHO EXAM OF ABDOMEN: CPT

## 2024-09-26 NOTE — PROGRESS NOTES
Patient is seen at the request of SELF for my opinion regarding diabetes. My final recommendations will be communicated back to the requesting provider by way of shared medical record.    Subjective   Roberto Pierre is a 53 y.o. male with a hx of type 2 diabetes, liver cirrhosis, kidney stones, who presents for management of diabetes.  Re-located from [place]. Was seeing an endocrinologist there.    Dx: approx 2012  HbA1c: 6.0% (10/3/2024), 5.8% (03/2024)  Current regimen: Jardiance 25 mg QDAY, Trulicity 3 mg/week  Past medications: glipizide, metformin, ozempic (insurance)  Complications: none    SMBG: does have a Malvin but has not been using    Hypoglycemia awareness: yes     Diet: 3 meals a day  Small breakfast (half a bagel or protein bar)  Lunch: sandwich  Dinner: left overs, frozen burritos    Exercise: not regularly    Today:  -intentionally lost 30 lbs on his own over the past year  -trying to lose more weight  -planning to start exercising; playing Mochila ball tonight    ROS  General: no fever or chills  CV: no chest pain   Respiratory: no shortness of breath  MSK: no lower extremity edema  Neuro: no headache or dizziness  See HPI for Endocrine ROS    Past Medical History:   Diagnosis Date    Cirrhosis of liver (Multi)     Diabetes (Multi)     Kidney stone        Past Surgical History:   Procedure Laterality Date    TRIGGER FINGER RELEASE         Social History     Socioeconomic History    Marital status: Unknown     Spouse name: Not on file    Number of children: Not on file    Years of education: Not on file    Highest education level: Not on file   Occupational History    Not on file   Tobacco Use    Smoking status: Never    Smokeless tobacco: Not on file   Vaping Use    Vaping status: Never Used   Substance and Sexual Activity    Alcohol use: Not Currently    Drug use: Not on file    Sexual activity: Not on file   Other Topics Concern    Not on file   Social History Narrative    Not on file     Social  "Determinants of Health     Financial Resource Strain: Not on file   Food Insecurity: Not on file   Transportation Needs: Not on file   Physical Activity: Not on file   Stress: Not on file   Social Connections: Not on file   Intimate Partner Violence: Not on file   Housing Stability: Not on file       Objective    Physical Exam  Blood pressure 138/68, pulse 78, temperature 36.3 °C (97.4 °F), temperature source Temporal, height 1.676 m (5' 6\"), weight 79.1 kg (174 lb 4.8 oz).  General: not in acute distress  HEENT: KARLY VALE  Thyroid: no goiter  Neuro: alert and oriented x 3  DIABETIC FOOT EXAM: 2+ pedal pulses bilaterally, 10/10 monofilament bilaterally, no open wounds or blisters    Current Outpatient Medications:     carvedilol (Coreg) 6.25 mg tablet, Take 1 tablet (6.25 mg) by mouth 2 times daily (morning and late afternoon). Take 1 tablet daily for 1 week. Then increase to twice daily., Disp: 60 tablet, Rfl: 11    fenofibrate (Tricor) 145 mg tablet, Take 1 tablet (145 mg) by mouth once daily., Disp: , Rfl:     pantoprazole (ProtoNix) 40 mg EC tablet, TAKE 1 TAB BY MOUTH EVERY MORNING, Disp: , Rfl:     Jardiance 25 mg, Take 1 tablet (25 mg) by mouth once daily., Disp: 90 tablet, Rfl: 3    OneTouch Delica Plus Lancet 33 gauge misc, Check blood glucose once daily, Disp: 100 each, Rfl: 3    OneTouch Ultra Test strip, Check blood glucose once daily, Disp: 100 strip, Rfl: 3    OneTouch Ultra2 Meter misc, Check blood glucose once a day, Disp: 1 each, Rfl: 0    [START ON 10/6/2024] tirzepatide (Mounjaro) 5 mg/0.5 mL pen injector, Inject 5 mg under the skin 1 (one) time per week., Disp: 6 mL, Rfl: 1    Assessment/Plan   Roberto Pierre is a 53 y.o. male with a hx of type 2 diabetes, liver cirrosis, kidney stones who presents for management of diabetes.    Type 2 diabetes mellitus  HbA1c: 6.0% (10/30/2024), 5.8% (03/2024)  Current regimen: Jardiance 25 mg QDAY, Trulicity 3 mg/week  Eye exam:  2 years ago  Urine microalbumin: " due  Podiatry: foot exam done today 10/3/2024  Lipids: HDL 53, LDL 69, TG 43 (03/2024) not on statin    A1c: 6.0% today. Excellent glycemic control.  Reports that he has been on Jardiance and Trulicity for a few years now.  Used to be on Ozempic but had to switch to Trulicity due to insurance coverage change (in [place]).  Was only on a low dose of Ozempic, so does not know if it made much impact on his weight.  He is interested in trying Mounjaro to help with further weight loss.  If Mounjaro is not covered, will try Ozempic. If Ozemic is not covered either, will increase Trulicity to 4.5 mg dose.  Was using a Malvin 2 previously, but did not like it because of the alarms.  He prefers to do fingersticks. Will send glucose meter and supplies.      PLAN:  -start Mounjaro 5 mg, once a week  -stop Trulicity  -continue Jardiance 25 mg QDAY  -check blood glucose once a day (alternate fasting and 2h post dinner)   -due for eye exam (provided referral today)  -check urine microalbumin    Follow up in 6 months   Uses [employer].

## 2024-10-03 ENCOUNTER — APPOINTMENT (OUTPATIENT)
Dept: ENDOCRINOLOGY | Facility: CLINIC | Age: 54
End: 2024-10-03
Payer: COMMERCIAL

## 2024-10-03 VITALS
BODY MASS INDEX: 28.01 KG/M2 | TEMPERATURE: 97.4 F | HEART RATE: 78 BPM | SYSTOLIC BLOOD PRESSURE: 138 MMHG | HEIGHT: 66 IN | WEIGHT: 174.3 LBS | DIASTOLIC BLOOD PRESSURE: 68 MMHG

## 2024-10-03 DIAGNOSIS — E11.649 TYPE 2 DIABETES MELLITUS WITH HYPOGLYCEMIA WITHOUT COMA, UNSPECIFIED WHETHER LONG TERM INSULIN USE: ICD-10-CM

## 2024-10-03 DIAGNOSIS — R09.89 OTHER SPECIFIED SYMPTOMS AND SIGNS INVOLVING THE CIRCULATORY AND RESPIRATORY SYSTEMS: ICD-10-CM

## 2024-10-03 DIAGNOSIS — E11.65 TYPE 2 DIABETES MELLITUS WITH HYPERGLYCEMIA, WITHOUT LONG-TERM CURRENT USE OF INSULIN: Primary | ICD-10-CM

## 2024-10-03 LAB — POC HEMOGLOBIN A1C: 6 % (ref 4.2–6.5)

## 2024-10-03 PROCEDURE — 3008F BODY MASS INDEX DOCD: CPT | Performed by: STUDENT IN AN ORGANIZED HEALTH CARE EDUCATION/TRAINING PROGRAM

## 2024-10-03 PROCEDURE — 99204 OFFICE O/P NEW MOD 45 MIN: CPT | Performed by: STUDENT IN AN ORGANIZED HEALTH CARE EDUCATION/TRAINING PROGRAM

## 2024-10-03 PROCEDURE — 3078F DIAST BP <80 MM HG: CPT | Performed by: STUDENT IN AN ORGANIZED HEALTH CARE EDUCATION/TRAINING PROGRAM

## 2024-10-03 PROCEDURE — 3075F SYST BP GE 130 - 139MM HG: CPT | Performed by: STUDENT IN AN ORGANIZED HEALTH CARE EDUCATION/TRAINING PROGRAM

## 2024-10-03 PROCEDURE — 83036 HEMOGLOBIN GLYCOSYLATED A1C: CPT | Performed by: STUDENT IN AN ORGANIZED HEALTH CARE EDUCATION/TRAINING PROGRAM

## 2024-10-03 PROCEDURE — 3044F HG A1C LEVEL LT 7.0%: CPT | Performed by: STUDENT IN AN ORGANIZED HEALTH CARE EDUCATION/TRAINING PROGRAM

## 2024-10-03 PROCEDURE — 3048F LDL-C <100 MG/DL: CPT | Performed by: STUDENT IN AN ORGANIZED HEALTH CARE EDUCATION/TRAINING PROGRAM

## 2024-10-03 RX ORDER — LANCETS 33 GAUGE
EACH MISCELLANEOUS
Qty: 100 EACH | Refills: 3 | Status: SHIPPED | OUTPATIENT
Start: 2024-10-03

## 2024-10-03 RX ORDER — EMPAGLIFLOZIN 25 MG/1
25 TABLET, FILM COATED ORAL DAILY
Qty: 90 TABLET | Refills: 3 | Status: SHIPPED | OUTPATIENT
Start: 2024-10-03

## 2024-10-03 RX ORDER — TIRZEPATIDE 5 MG/.5ML
5 INJECTION, SOLUTION SUBCUTANEOUS
Qty: 6 ML | Refills: 1 | Status: SHIPPED | OUTPATIENT
Start: 2024-10-06

## 2024-10-03 RX ORDER — LANCETS 30 GAUGE
EACH MISCELLANEOUS
Qty: 1 EACH | Refills: 0 | Status: SHIPPED | OUTPATIENT
Start: 2024-10-03

## 2024-10-03 RX ORDER — BLOOD SUGAR DIAGNOSTIC
STRIP MISCELLANEOUS
Qty: 100 STRIP | Refills: 3 | Status: SHIPPED | OUTPATIENT
Start: 2024-10-03

## 2024-10-03 ASSESSMENT — PATIENT HEALTH QUESTIONNAIRE - PHQ9
1. LITTLE INTEREST OR PLEASURE IN DOING THINGS: NOT AT ALL
2. FEELING DOWN, DEPRESSED OR HOPELESS: NOT AT ALL
SUM OF ALL RESPONSES TO PHQ9 QUESTIONS 1 AND 2: 0

## 2024-10-03 NOTE — PATIENT INSTRUCTIONS
Start Mounjaro 5 mg, once a week (if covered by insurance)  If Mounjaro is not covered, will try Ozempic  If Ozempic is not covered, will increase Trulicity to 4.5 mg  Continue Jardiance  Please schedule eye exam  Please do the urine test at your earliest convenience  Check blood glucose once a day (alternate fasting and 2H after dinner)  Goal fasting blood sugar:   Goal 2H post meals: < 180

## 2024-10-10 ENCOUNTER — APPOINTMENT (OUTPATIENT)
Dept: GASTROENTEROLOGY | Facility: CLINIC | Age: 54
End: 2024-10-10
Payer: COMMERCIAL

## 2024-10-14 DIAGNOSIS — E78.1 HYPERTRIGLYCERIDEMIA: Primary | ICD-10-CM

## 2024-10-14 RX ORDER — FENOFIBRATE 145 MG/1
145 TABLET, FILM COATED ORAL DAILY
Qty: 90 TABLET | Refills: 3 | Status: SHIPPED | OUTPATIENT
Start: 2024-10-14

## 2024-11-07 ENCOUNTER — OFFICE VISIT (OUTPATIENT)
Dept: GASTROENTEROLOGY | Facility: CLINIC | Age: 54
End: 2024-11-07
Payer: COMMERCIAL

## 2024-11-07 DIAGNOSIS — M99.08 RIB CAGE DYSFUNCTION: ICD-10-CM

## 2024-11-07 DIAGNOSIS — K76.6 PORTAL HYPERTENSION (MULTI): ICD-10-CM

## 2024-11-07 DIAGNOSIS — E88.01 HETEROZYGOUS ALPHA 1-ANTITRYPSIN DEFICIENCY (MULTI): ICD-10-CM

## 2024-11-07 DIAGNOSIS — K74.60 CIRRHOSIS OF LIVER WITHOUT ASCITES, UNSPECIFIED HEPATIC CIRRHOSIS TYPE (MULTI): ICD-10-CM

## 2024-11-07 DIAGNOSIS — K76.0 METABOLIC DYSFUNCTION-ASSOCIATED STEATOTIC LIVER DISEASE (MASLD): Primary | ICD-10-CM

## 2024-11-07 PROCEDURE — 99214 OFFICE O/P EST MOD 30 MIN: CPT | Performed by: INTERNAL MEDICINE

## 2024-11-07 PROCEDURE — 3044F HG A1C LEVEL LT 7.0%: CPT | Performed by: INTERNAL MEDICINE

## 2024-11-07 PROCEDURE — 3048F LDL-C <100 MG/DL: CPT | Performed by: INTERNAL MEDICINE

## 2024-11-07 PROCEDURE — 99417 PROLNG OP E/M EACH 15 MIN: CPT | Performed by: INTERNAL MEDICINE

## 2024-11-07 NOTE — LETTER
"November 14, 2024     Amando Amaya MD  [address] [place], [facility] [address]  [facility] [address]    Patient: Roberto Pierre   YOB: 1970   Date of Visit: 11/7/2024       Dear Dr. Amando Amaya MD:    Thank you for referring Roberto Pierre to me for evaluation. Below are my notes for this consultation.  If you have questions, please do not hesitate to call me. I look forward to following your patient along with you.       Sincerely,     Felton Feldman MD      CC: No Recipients  ______________________________________________________________________________________    Subjective     Evaluation and management of cirrhosis, follow up appointment.    History of Present Illness:   Roberto Pierre is a 54 y.o. male who presents to the [facility] at [facility] for a follow up appointment.    He is referred by his primary care physician Dr. Amando Amaya.    Roberto or \"Anand\" moved to the [place] area last year.  He relocated from [place] with his wife, he was first diagnosed with a \"fatty liver\" in 2010 and cirrhosis in 2017.  He has been under the care of gastroenterologist in the past.    He explains that his liver disease diagnosis was not a surprise. He has a mother with a history of cirrhosis who underwent liver transplant in her 50s at the [facility] in [place], [place].  Her liver transplant was in 2003, over 20 years ago.  She is doing quite well.  Since her liver transplant she also had a kidney transplant.    He also explains that he has 2 brothers - with fatty liver disease.  His older brother is also cirrhotic.    He denies any major complications from his liver disease.  He does recall having an endoscopy and banding of varices in the past.    I was able to confirm the history of esophageal varices based on review of his records. His last EGD was on 1/2/24: grade 1 EV in the lower third of the esophagus; diffuse PHG, localized erythema with dark heme. + erosive gastritis. + " erythema in the duodenal bulb. Biopsies: + erosive peptic duodenitis, erosive/ulcerating gastritis, negative for H pylori. Three EGD prior to this 2024 procedure: first 2 EGD with variceal band ligation.     Colonoscopy 10/19/20: 5 mm polyp in the rectum, hyperplastic polyp.    He has never been jaundiced.  He has never developed fluid overload symptoms or ascites.  He has never had any confusion symptoms.  He works in senior management at a high level.    Since his initial visit, I received outside records, which were reviewed during this office visit.    Review of Systems  Review of Systems    Please refer to the new patient questionnaire for self-reported review of systems.    He has made a concerted effort to lose weight over the years.  He says it is he has lost about 30 pounds in total through diet and exercise. He has been on GLP-1 therapy: previously on Trulicity, now on Mounjaro 5 mg weekly.    Has a knot/knob protruding at the bottom of his right rib cage, which he points outs during physical exam.    Past Medical History   has a past medical history of Cirrhosis of liver (Multi), Diabetes (Multi), and Kidney stone.     Past medical history is significant for diabetes, which appears to be fairly well-controlled.  He takes Trulicity and Jardiance --> Mounjaro + Jardiance.    He also has mixed hyperlipidemia and takes fenofibrate for that.    He denies any surgical history.    I conducted a full medication review; denies any allergies to medications.    Social History   reports that he has never smoked. He does not have any smokeless tobacco history on file. He reports that he does not currently use alcohol.     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  He is .  He was previously living in [facility].    He denies cigarette smoking.  Denies alcohol use.  He denies any recreational drug use.    Family History  family history includes Breast cancer in his  "sister; Cirrhosis in his mother; Sleep apnea in his brother.     He has a significant family history of MASLD and cirrhosis as noted in the history of present illness.    Allergies  No Known Allergies    Medications  Current Outpatient Medications   Medication Instructions   • carvedilol (COREG) 6.25 mg, oral, 2 times daily (morning and late afternoon), Take 1 tablet daily for 1 week. Then increase to twice daily.   • fenofibrate (TRICOR) 145 mg, oral, Daily   • Jardiance 25 mg, oral, Daily   • Mounjaro 5 mg, subcutaneous, Once Weekly   • OneTouch Delica Plus Lancet 33 gauge misc Check blood glucose once daily   • OneTouch Ultra Test strip Check blood glucose once daily   • OneTouch Ultra2 Meter misc Check blood glucose once a day   • pantoprazole (ProtoNix) 40 mg EC tablet TAKE 1 TAB BY MOUTH EVERY MORNING        Objective   There were no vitals taken for this visit.         3/25/2024     8:34 AM 9/5/2024     3:42 PM 10/3/2024     9:24 AM   Vitals   Systolic 132 131 138   Diastolic 68 68 68   Heart Rate 99 80 78   Temp  36.4 °C (97.5 °F) 36.3 °C (97.4 °F)   Height (in) 1.676 m (5' 6\") 1.676 m (5' 6\") 1.676 m (5' 6\")   Weight (lb) 170 175 174.3   BMI 27.44 kg/m2 28.25 kg/m2 28.13 kg/m2   BSA (m2) 1.89 m2 1.92 m2 1.92 m2   Visit Report Report Report Report     Reviewed home log of BP HR ~ 150/80, 70s    Physical Exam  Vitals and nursing note reviewed.   Constitutional:       Appearance: Normal appearance.   HENT:      Head: Normocephalic and atraumatic.      Mouth/Throat:      Mouth: Mucous membranes are moist.      Pharynx: Oropharynx is clear.   Eyes:      General: No scleral icterus.     Extraocular Movements: Extraocular movements intact.      Pupils: Pupils are equal, round, and reactive to light.   Cardiovascular:      Rate and Rhythm: Normal rate and regular rhythm.      Heart sounds: No murmur heard.  Pulmonary:      Effort: Pulmonary effort is normal.      Breath sounds: Normal breath sounds.   Abdominal:    "   General: Abdomen is flat. Bowel sounds are normal.      Palpations: Abdomen is soft. There is splenomegaly.      Comments: Hard liver edge.   Musculoskeletal:         General: No swelling. Normal range of motion.      Right lower leg: No edema.      Left lower leg: No edema.   Skin:     Coloration: Skin is not jaundiced.   Neurological:      General: No focal deficit present.      Mental Status: He is alert and oriented to person, place, and time. Mental status is at baseline.   Psychiatric:         Mood and Affect: Mood normal.         Thought Content: Thought content normal.         Judgment: Judgment normal.     Small rubbery protruding tissue, palpable at the bottom of the right rib cage.    Labs    WBC   Date/Time Value Ref Range Status   03/25/2024 09:08 AM 5.9 4.4 - 11.3 x10*3/uL Final     Hemoglobin   Date/Time Value Ref Range Status   03/25/2024 09:08 AM 13.3 (L) 13.5 - 17.5 g/dL Final     Hematocrit   Date/Time Value Ref Range Status   03/25/2024 09:08 AM 40.5 (L) 41.0 - 52.0 % Final     MCV   Date/Time Value Ref Range Status   03/25/2024 09:08 AM 88 80 - 100 fL Final     Platelets   Date/Time Value Ref Range Status   03/25/2024 09:08 AM 64 (L) 150 - 450 x10*3/uL Final     Comment:     Platelet count verified by smear review.        Total Protein   Date/Time Value Ref Range Status   03/25/2024 09:08 AM 6.5 6.4 - 8.2 g/dL Final     Albumin   Date/Time Value Ref Range Status   03/25/2024 09:08 AM 3.3 (L) 3.4 - 5.0 g/dL Final     AST   Date/Time Value Ref Range Status   03/25/2024 09:08 AM 27 9 - 39 U/L Final     ALT   Date/Time Value Ref Range Status   03/25/2024 09:08 AM 16 10 - 52 U/L Final     Comment:     Patients treated with Sulfasalazine may generate falsely decreased results for ALT.     Alkaline Phosphatase   Date/Time Value Ref Range Status   03/25/2024 09:08 AM 71 33 - 120 U/L Final     Bilirubin, Total   Date/Time Value Ref Range Status   03/25/2024 09:08 AM 1.6 (H) 0.0 - 1.2 mg/dL Final         Vitamin D, 25-Hydroxy, Total   Date/Time Value Ref Range Status   03/25/2024 09:08 AM 35 30 - 100 ng/mL Final        AST   Date/Time Value Ref Range Status   03/25/2024 09:08 AM 27 9 - 39 U/L Final     ALT   Date/Time Value Ref Range Status   03/25/2024 09:08 AM 16 10 - 52 U/L Final     Comment:     Patients treated with Sulfasalazine may generate falsely decreased results for ALT.     Alkaline Phosphatase   Date/Time Value Ref Range Status   03/25/2024 09:08 AM 71 33 - 120 U/L Final     Bilirubin, Total   Date/Time Value Ref Range Status   03/25/2024 09:08 AM 1.6 (H) 0.0 - 1.2 mg/dL Final     Albumin   Date/Time Value Ref Range Status   03/25/2024 09:08 AM 3.3 (L) 3.4 - 5.0 g/dL Final     Total Protein   Date/Time Value Ref Range Status   03/25/2024 09:08 AM 6.5 6.4 - 8.2 g/dL Final     2011  MELIDA neg  ASMA neg  Normal ceruloplasmin  Normal iron  Hep A/B/C/ neg  A1AT MZ    1/2/24:  Small EV  PHG  Gastric erythema.  Duodenitis    FIBROSCAN:     6/17/2024    Liver stiffness measurement of 27.5 kPa and a CAP score of 243    Liver US  9/2024  IMPRESSION:  Multiple gallbladder polyps, for which 12 month follow-up is  recommended.      Heterogeneous hepatic echotexture supporting known clinical diagnosis  of cirrhosis. Recanalized umbilical vein with enlarged main portal  vein suggesting portal hypertension. Splenomegaly is also present.    Assessment/Plan   Roberto Pierre is a 54 y.o. male who presents to hepatology clinic to establish his liver care.    He has MASLD and cirrhosis by history.    His liver function appears to be intact.  I do not have an INR to calculate his MELD score.  He appears to have compensated advanced chronic liver disease and cirrhosis.    He reports a history of esophageal varices this would be consistent with his thrombocytopenia and splenomegaly on physical exam as well as his FibroScan result of 27.5 kPa.    Plan:  MELD Labs  AFP  Regarding his family history of liver disease: repeat  Ceruloplasmin, A1AT level/phenotyping, HFE genetics.    I have reviewed his prior records  from former gastroenterologist Dr. Genaro August, [place].    I have recommended liver cancer screening with a liver ultrasound every 6m.      He can increase carvedilol to BID dosing (6.25 mg) for primary prevention against variceal bleeding and to prevent progression of his portal hypertension.      Will obtain a R sided Rib X ray due to his concern.    He should follow my clinic every 6 months    Instructions      Felton Feldman MD

## 2024-11-07 NOTE — PROGRESS NOTES
"Subjective     Evaluation and management of cirrhosis, follow up appointment.    History of Present Illness:   Roberto Pierre is a 54 y.o. male who presents to the [facility] at [facility] for a follow up appointment.    He is referred by his primary care physician Dr. Amando Amaya.    Roberto or \"Anand\" moved to the [place] area last year.  He relocated from [place] with his wife, he was first diagnosed with a \"fatty liver\" in 2010 and cirrhosis in 2017.  He has been under the care of gastroenterologist in the past.    He explains that his liver disease diagnosis was not a surprise. He has a mother with a history of cirrhosis who underwent liver transplant in her 50s at the [facility] in [place], [place].  Her liver transplant was in 2003, over 20 years ago.  She is doing quite well.  Since her liver transplant she also had a kidney transplant.    He also explains that he has 2 brothers - with fatty liver disease.  His older brother is also cirrhotic.    He denies any major complications from his liver disease.  He does recall having an endoscopy and banding of varices in the past.    I was able to confirm the history of esophageal varices based on review of his records. His last EGD was on 1/2/24: grade 1 EV in the lower third of the esophagus; diffuse PHG, localized erythema with dark heme. + erosive gastritis. + erythema in the duodenal bulb. Biopsies: + erosive peptic duodenitis, erosive/ulcerating gastritis, negative for H pylori. Three EGD prior to this 2024 procedure: first 2 EGD with variceal band ligation.     Colonoscopy 10/19/20: 5 mm polyp in the rectum, hyperplastic polyp.    He has never been jaundiced.  He has never developed fluid overload symptoms or ascites.  He has never had any confusion symptoms.  He works in senior management at a high level.    Since his initial visit, I received outside records, which were reviewed during this office visit.    Review of Systems  Review of " Systems    Please refer to the new patient questionnaire for self-reported review of systems.    He has made a concerted effort to lose weight over the years.  He says it is he has lost about 30 pounds in total through diet and exercise. He has been on GLP-1 therapy: previously on Trulicity, now on Mounjaro 5 mg weekly.    Has a knot/knob protruding at the bottom of his right rib cage, which he points outs during physical exam.    Past Medical History   has a past medical history of Cirrhosis of liver (Multi), Diabetes (Multi), and Kidney stone.     Past medical history is significant for diabetes, which appears to be fairly well-controlled.  He takes Trulicity and Jardiance --> Mounjaro + Jardiance.    He also has mixed hyperlipidemia and takes fenofibrate for that.    He denies any surgical history.    I conducted a full medication review; denies any allergies to medications.    Social History   reports that he has never smoked. He does not have any smokeless tobacco history on file. He reports that he does not currently use alcohol.     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  He is .  He was previously living in [facility].    He denies cigarette smoking.  Denies alcohol use.  He denies any recreational drug use.    Family History  family history includes Breast cancer in his sister; Cirrhosis in his mother; Sleep apnea in his brother.     He has a significant family history of MASLD and cirrhosis as noted in the history of present illness.    Allergies  No Known Allergies    Medications  Current Outpatient Medications   Medication Instructions    carvedilol (COREG) 6.25 mg, oral, 2 times daily (morning and late afternoon), Take 1 tablet daily for 1 week. Then increase to twice daily.    fenofibrate (TRICOR) 145 mg, oral, Daily    Jardiance 25 mg, oral, Daily    Mounjaro 5 mg, subcutaneous, Once Weekly    OneTouch Delica Plus Lancet 33 gauge misc Check blood  "glucose once daily    OneTouch Ultra Test strip Check blood glucose once daily    OneTouch Ultra2 Meter misc Check blood glucose once a day    pantoprazole (ProtoNix) 40 mg EC tablet TAKE 1 TAB BY MOUTH EVERY MORNING        Objective   There were no vitals taken for this visit.         3/25/2024     8:34 AM 9/5/2024     3:42 PM 10/3/2024     9:24 AM   Vitals   Systolic 132 131 138   Diastolic 68 68 68   Heart Rate 99 80 78   Temp  36.4 °C (97.5 °F) 36.3 °C (97.4 °F)   Height (in) 1.676 m (5' 6\") 1.676 m (5' 6\") 1.676 m (5' 6\")   Weight (lb) 170 175 174.3   BMI 27.44 kg/m2 28.25 kg/m2 28.13 kg/m2   BSA (m2) 1.89 m2 1.92 m2 1.92 m2   Visit Report Report Report Report     Reviewed home log of BP HR ~ 150/80, 70s    Physical Exam  Vitals and nursing note reviewed.   Constitutional:       Appearance: Normal appearance.   HENT:      Head: Normocephalic and atraumatic.      Mouth/Throat:      Mouth: Mucous membranes are moist.      Pharynx: Oropharynx is clear.   Eyes:      General: No scleral icterus.     Extraocular Movements: Extraocular movements intact.      Pupils: Pupils are equal, round, and reactive to light.   Cardiovascular:      Rate and Rhythm: Normal rate and regular rhythm.      Heart sounds: No murmur heard.  Pulmonary:      Effort: Pulmonary effort is normal.      Breath sounds: Normal breath sounds.   Abdominal:      General: Abdomen is flat. Bowel sounds are normal.      Palpations: Abdomen is soft. There is splenomegaly.      Comments: Hard liver edge.   Musculoskeletal:         General: No swelling. Normal range of motion.      Right lower leg: No edema.      Left lower leg: No edema.   Skin:     Coloration: Skin is not jaundiced.   Neurological:      General: No focal deficit present.      Mental Status: He is alert and oriented to person, place, and time. Mental status is at baseline.   Psychiatric:         Mood and Affect: Mood normal.         Thought Content: Thought content normal.         " Judgment: Judgment normal.     Small rubbery protruding tissue, palpable at the bottom of the right rib cage.    Labs    WBC   Date/Time Value Ref Range Status   03/25/2024 09:08 AM 5.9 4.4 - 11.3 x10*3/uL Final     Hemoglobin   Date/Time Value Ref Range Status   03/25/2024 09:08 AM 13.3 (L) 13.5 - 17.5 g/dL Final     Hematocrit   Date/Time Value Ref Range Status   03/25/2024 09:08 AM 40.5 (L) 41.0 - 52.0 % Final     MCV   Date/Time Value Ref Range Status   03/25/2024 09:08 AM 88 80 - 100 fL Final     Platelets   Date/Time Value Ref Range Status   03/25/2024 09:08 AM 64 (L) 150 - 450 x10*3/uL Final     Comment:     Platelet count verified by smear review.        Total Protein   Date/Time Value Ref Range Status   03/25/2024 09:08 AM 6.5 6.4 - 8.2 g/dL Final     Albumin   Date/Time Value Ref Range Status   03/25/2024 09:08 AM 3.3 (L) 3.4 - 5.0 g/dL Final     AST   Date/Time Value Ref Range Status   03/25/2024 09:08 AM 27 9 - 39 U/L Final     ALT   Date/Time Value Ref Range Status   03/25/2024 09:08 AM 16 10 - 52 U/L Final     Comment:     Patients treated with Sulfasalazine may generate falsely decreased results for ALT.     Alkaline Phosphatase   Date/Time Value Ref Range Status   03/25/2024 09:08 AM 71 33 - 120 U/L Final     Bilirubin, Total   Date/Time Value Ref Range Status   03/25/2024 09:08 AM 1.6 (H) 0.0 - 1.2 mg/dL Final        Vitamin D, 25-Hydroxy, Total   Date/Time Value Ref Range Status   03/25/2024 09:08 AM 35 30 - 100 ng/mL Final        AST   Date/Time Value Ref Range Status   03/25/2024 09:08 AM 27 9 - 39 U/L Final     ALT   Date/Time Value Ref Range Status   03/25/2024 09:08 AM 16 10 - 52 U/L Final     Comment:     Patients treated with Sulfasalazine may generate falsely decreased results for ALT.     Alkaline Phosphatase   Date/Time Value Ref Range Status   03/25/2024 09:08 AM 71 33 - 120 U/L Final     Bilirubin, Total   Date/Time Value Ref Range Status   03/25/2024 09:08 AM 1.6 (H) 0.0 - 1.2 mg/dL  Final     Albumin   Date/Time Value Ref Range Status   03/25/2024 09:08 AM 3.3 (L) 3.4 - 5.0 g/dL Final     Total Protein   Date/Time Value Ref Range Status   03/25/2024 09:08 AM 6.5 6.4 - 8.2 g/dL Final     2011  MELIDA neg  ASMA neg  Normal ceruloplasmin  Normal iron  Hep A/B/C/ neg  A1AT MZ    1/2/24:  Small EV  PHG  Gastric erythema.  Duodenitis    FIBROSCAN:     6/17/2024    Liver stiffness measurement of 27.5 kPa and a CAP score of 243    Liver US  9/2024  IMPRESSION:  Multiple gallbladder polyps, for which 12 month follow-up is  recommended.      Heterogeneous hepatic echotexture supporting known clinical diagnosis  of cirrhosis. Recanalized umbilical vein with enlarged main portal  vein suggesting portal hypertension. Splenomegaly is also present.    Assessment/Plan   Roberto Pierre is a 54 y.o. male who presents to hepatology clinic to establish his liver care.    He has MASLD and cirrhosis by history.    His liver function appears to be intact.  I do not have an INR to calculate his MELD score.  He appears to have compensated advanced chronic liver disease and cirrhosis.    He reports a history of esophageal varices this would be consistent with his thrombocytopenia and splenomegaly on physical exam as well as his FibroScan result of 27.5 kPa.    Plan:  MELD Labs  AFP  Regarding his family history of liver disease: repeat Ceruloplasmin, A1AT level/phenotyping, HFE genetics.    I have reviewed his prior records  from former gastroenterologist Dr. Genaro August, [place].    I have recommended liver cancer screening with a liver ultrasound every 6m.      He can increase carvedilol to BID dosing (6.25 mg) for primary prevention against variceal bleeding and to prevent progression of his portal hypertension.      Will obtain a R sided Rib X ray due to his concern.    He should follow my clinic every 6 months    Instructions      Felton Feldman MD

## 2024-11-07 NOTE — PATIENT INSTRUCTIONS
Welcome to Dr. Felton Feldman's [facility].  Dr. Feldman sees patients at the following sites:  [place] [address] [facility] at [facility]  [place] [place], [address] at [facility] at [facility], [facility] [address]    Dr. Feldman's hepatology care coordinator, Bettie HILLS, can be reached at [phone].  Dr. Feldman's , Darlene Sanchez, can be reached at [phone].    Get blood work to further evaluate your liver and to get a MELD score.    Get an Xray for your rib pain. [phone].    See me back in clinic in 6 months.   Call [phone] to schedule.

## 2024-11-08 ENCOUNTER — HOSPITAL ENCOUNTER (OUTPATIENT)
Dept: RADIOLOGY | Facility: HOSPITAL | Age: 54
Discharge: HOME | End: 2024-11-08
Payer: COMMERCIAL

## 2024-11-08 DIAGNOSIS — K76.6 PORTAL HYPERTENSION (MULTI): ICD-10-CM

## 2024-11-08 DIAGNOSIS — K76.0 METABOLIC DYSFUNCTION-ASSOCIATED STEATOTIC LIVER DISEASE (MASLD): ICD-10-CM

## 2024-11-08 DIAGNOSIS — M99.08 RIB CAGE DYSFUNCTION: ICD-10-CM

## 2024-11-08 DIAGNOSIS — K74.60 CIRRHOSIS OF LIVER WITHOUT ASCITES, UNSPECIFIED HEPATIC CIRRHOSIS TYPE (MULTI): ICD-10-CM

## 2024-11-08 DIAGNOSIS — E88.01 HETEROZYGOUS ALPHA 1-ANTITRYPSIN DEFICIENCY (MULTI): ICD-10-CM

## 2024-11-08 PROCEDURE — 71101 X-RAY EXAM UNILAT RIBS/CHEST: CPT | Mod: RT

## 2024-11-16 ENCOUNTER — LAB (OUTPATIENT)
Dept: LAB | Facility: LAB | Age: 54
End: 2024-11-16
Payer: COMMERCIAL

## 2024-11-16 ENCOUNTER — DOCUMENTATION (OUTPATIENT)
Dept: GASTROENTEROLOGY | Facility: HOSPITAL | Age: 54
End: 2024-11-16

## 2024-11-16 DIAGNOSIS — K76.6 PORTAL HYPERTENSION (MULTI): ICD-10-CM

## 2024-11-16 DIAGNOSIS — E11.65 TYPE 2 DIABETES MELLITUS WITH HYPERGLYCEMIA, WITHOUT LONG-TERM CURRENT USE OF INSULIN: ICD-10-CM

## 2024-11-16 DIAGNOSIS — K74.60 CIRRHOSIS OF LIVER WITHOUT ASCITES, UNSPECIFIED HEPATIC CIRRHOSIS TYPE (MULTI): ICD-10-CM

## 2024-11-16 DIAGNOSIS — K76.0 METABOLIC DYSFUNCTION-ASSOCIATED STEATOTIC LIVER DISEASE (MASLD): ICD-10-CM

## 2024-11-16 DIAGNOSIS — E88.01 HETEROZYGOUS ALPHA 1-ANTITRYPSIN DEFICIENCY (MULTI): ICD-10-CM

## 2024-11-16 LAB
AFP SERPL-MCNC: <4 NG/ML (ref 0–9)
ALBUMIN SERPL BCP-MCNC: 3.3 G/DL (ref 3.4–5)
ALP SERPL-CCNC: 72 U/L (ref 33–120)
ALT SERPL W P-5'-P-CCNC: 15 U/L (ref 10–52)
ANION GAP SERPL CALC-SCNC: 11 MMOL/L (ref 10–20)
AST SERPL W P-5'-P-CCNC: 30 U/L (ref 9–39)
BASOPHILS # BLD AUTO: 0.01 X10*3/UL (ref 0–0.1)
BASOPHILS NFR BLD AUTO: 0.3 %
BILIRUB DIRECT SERPL-MCNC: 0.5 MG/DL (ref 0–0.3)
BILIRUB SERPL-MCNC: 1.6 MG/DL (ref 0–1.2)
BUN SERPL-MCNC: 15 MG/DL (ref 6–23)
CALCIUM SERPL-MCNC: 8.5 MG/DL (ref 8.6–10.6)
CHLORIDE SERPL-SCNC: 114 MMOL/L (ref 98–107)
CO2 SERPL-SCNC: 23 MMOL/L (ref 21–32)
CREAT SERPL-MCNC: 1.18 MG/DL (ref 0.5–1.3)
CREAT UR-MCNC: 174.1 MG/DL (ref 20–370)
EGFRCR SERPLBLD CKD-EPI 2021: 73 ML/MIN/1.73M*2
EOSINOPHIL # BLD AUTO: 0.07 X10*3/UL (ref 0–0.7)
EOSINOPHIL NFR BLD AUTO: 2.4 %
ERYTHROCYTE [DISTWIDTH] IN BLOOD BY AUTOMATED COUNT: 15.2 % (ref 11.5–14.5)
GLUCOSE SERPL-MCNC: 101 MG/DL (ref 74–99)
HCT VFR BLD AUTO: 39.2 % (ref 41–52)
HGB BLD-MCNC: 12.9 G/DL (ref 13.5–17.5)
IMM GRANULOCYTES # BLD AUTO: 0.01 X10*3/UL (ref 0–0.7)
IMM GRANULOCYTES NFR BLD AUTO: 0.3 % (ref 0–0.9)
INR PPP: 1.8 (ref 0.9–1.1)
LYMPHOCYTES # BLD AUTO: 0.73 X10*3/UL (ref 1.2–4.8)
LYMPHOCYTES NFR BLD AUTO: 25 %
MCH RBC QN AUTO: 29.3 PG (ref 26–34)
MCHC RBC AUTO-ENTMCNC: 32.9 G/DL (ref 32–36)
MCV RBC AUTO: 89 FL (ref 80–100)
MICROALBUMIN UR-MCNC: <7 MG/L
MICROALBUMIN/CREAT UR: NORMAL MG/G{CREAT}
MONOCYTES # BLD AUTO: 0.26 X10*3/UL (ref 0.1–1)
MONOCYTES NFR BLD AUTO: 8.9 %
NEUTROPHILS # BLD AUTO: 1.84 X10*3/UL (ref 1.2–7.7)
NEUTROPHILS NFR BLD AUTO: 63.1 %
NRBC BLD-RTO: 0 /100 WBCS (ref 0–0)
PLATELET # BLD AUTO: 37 X10*3/UL (ref 150–450)
POTASSIUM SERPL-SCNC: 4.2 MMOL/L (ref 3.5–5.3)
PROT SERPL-MCNC: 6.2 G/DL (ref 6.4–8.2)
PROTHROMBIN TIME: 20.1 SECONDS (ref 9.8–12.8)
RBC # BLD AUTO: 4.41 X10*6/UL (ref 4.5–5.9)
SODIUM SERPL-SCNC: 144 MMOL/L (ref 136–145)
WBC # BLD AUTO: 2.9 X10*3/UL (ref 4.4–11.3)

## 2024-11-16 PROCEDURE — 82248 BILIRUBIN DIRECT: CPT

## 2024-11-16 PROCEDURE — 36415 COLL VENOUS BLD VENIPUNCTURE: CPT

## 2024-11-16 PROCEDURE — 82570 ASSAY OF URINE CREATININE: CPT

## 2024-11-16 PROCEDURE — 85025 COMPLETE CBC W/AUTO DIFF WBC: CPT

## 2024-11-16 PROCEDURE — 80053 COMPREHEN METABOLIC PANEL: CPT

## 2024-11-16 PROCEDURE — 81256 HFE GENE: CPT

## 2024-11-16 PROCEDURE — 85610 PROTHROMBIN TIME: CPT

## 2024-11-16 PROCEDURE — 82043 UR ALBUMIN QUANTITATIVE: CPT

## 2024-11-16 PROCEDURE — 82104 ALPHA-1-ANTITRYPSIN PHENO: CPT

## 2024-11-16 PROCEDURE — 82390 ASSAY OF CERULOPLASMIN: CPT

## 2024-11-16 PROCEDURE — 82105 ALPHA-FETOPROTEIN SERUM: CPT

## 2024-11-17 LAB — CERULOPLASMIN SERPL-MCNC: 15.7 MG/DL (ref 20–60)

## 2024-11-17 NOTE — PROGRESS NOTES
Critical lab value of PLT 37 from 64 3/2024 that was ordered from hepatologist, Dr. Feldman. Called pt to see if any signs of bleeding but no answer. VM left. Routing to Dr. Feldman.

## 2024-11-21 LAB
A1AT PHENOTYP SERPL-IMP: ABNORMAL
A1AT SERPL-MCNC: 70 MG/DL (ref 90–200)

## 2024-11-26 LAB
ELECTRONICALLY SIGNED BY: NORMAL
HFE GENE MUT TESTED BLD/T: NORMAL
HFE P.C282Y BLD/T QL: NORMAL
HFE P.H63D BLD/T QL: NORMAL

## 2025-02-03 ENCOUNTER — APPOINTMENT (OUTPATIENT)
Dept: OPHTHALMOLOGY | Facility: CLINIC | Age: 55
End: 2025-02-03
Payer: COMMERCIAL

## 2025-02-03 DIAGNOSIS — H52.4 PRESBYOPIA: ICD-10-CM

## 2025-02-03 DIAGNOSIS — H52.13 MYOPIA OF BOTH EYES: Primary | ICD-10-CM

## 2025-02-03 DIAGNOSIS — E11.65 TYPE 2 DIABETES MELLITUS WITH HYPERGLYCEMIA, WITHOUT LONG-TERM CURRENT USE OF INSULIN: ICD-10-CM

## 2025-02-03 PROCEDURE — 92015 DETERMINE REFRACTIVE STATE: CPT | Performed by: OPHTHALMOLOGY

## 2025-02-03 PROCEDURE — 92004 COMPRE OPH EXAM NEW PT 1/>: CPT | Performed by: OPHTHALMOLOGY

## 2025-02-03 ASSESSMENT — CONF VISUAL FIELD
OD_SUPERIOR_TEMPORAL_RESTRICTION: 0
OD_INFERIOR_TEMPORAL_RESTRICTION: 0
OD_NORMAL: 1
OS_SUPERIOR_NASAL_RESTRICTION: 0
OS_INFERIOR_NASAL_RESTRICTION: 0
OS_NORMAL: 1
OS_SUPERIOR_TEMPORAL_RESTRICTION: 0
OD_SUPERIOR_NASAL_RESTRICTION: 0
OS_INFERIOR_TEMPORAL_RESTRICTION: 0
OD_INFERIOR_NASAL_RESTRICTION: 0
METHOD: COUNTING FINGERS

## 2025-02-03 ASSESSMENT — SLIT LAMP EXAM - LIDS
COMMENTS: NORMAL
COMMENTS: NORMAL

## 2025-02-03 ASSESSMENT — REFRACTION_WEARINGRX
SPECS_TYPE: PAL
OD_CYLINDER: -0.50
OD_AXIS: 118
OS_SPHERE: -4.00
OD_SPHERE: -4.00

## 2025-02-03 ASSESSMENT — EXTERNAL EXAM - RIGHT EYE: OD_EXAM: NORMAL

## 2025-02-03 ASSESSMENT — REFRACTION_MANIFEST
OD_AXIS: 118
OS_ADD: +2.00
OD_CYLINDER: -0.50
OD_SPHERE: -3.50
OD_ADD: +2.00
OS_SPHERE: -3.25

## 2025-02-03 ASSESSMENT — TONOMETRY
IOP_METHOD: TONOPEN
OD_IOP_MMHG: 10
OS_IOP_MMHG: 12

## 2025-02-03 ASSESSMENT — VISUAL ACUITY
OS_CC: 20/20
OD_CC: 20/20
OS_CC+: -2
CORRECTION_TYPE: GLASSES
METHOD: SNELLEN - LINEAR

## 2025-02-03 ASSESSMENT — CUP TO DISC RATIO
OD_RATIO: 0.2
OS_RATIO: 0.2

## 2025-02-03 ASSESSMENT — ENCOUNTER SYMPTOMS
MUSCULOSKELETAL NEGATIVE: 0
ENDOCRINE NEGATIVE: 0
GASTROINTESTINAL NEGATIVE: 0
CONSTITUTIONAL NEGATIVE: 0
NEUROLOGICAL NEGATIVE: 0
HEMATOLOGIC/LYMPHATIC NEGATIVE: 0
RESPIRATORY NEGATIVE: 0
ALLERGIC/IMMUNOLOGIC NEGATIVE: 0
EYES NEGATIVE: 1
CARDIOVASCULAR NEGATIVE: 0
PSYCHIATRIC NEGATIVE: 0

## 2025-02-03 ASSESSMENT — EXTERNAL EXAM - LEFT EYE: OS_EXAM: NORMAL

## 2025-02-03 NOTE — PROGRESS NOTES
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

## 2025-03-18 ENCOUNTER — OFFICE VISIT (OUTPATIENT)
Dept: URGENT CARE | Age: 55
End: 2025-03-18
Payer: COMMERCIAL

## 2025-03-18 VITALS
OXYGEN SATURATION: 96 % | HEART RATE: 80 BPM | RESPIRATION RATE: 16 BRPM | TEMPERATURE: 98 F | SYSTOLIC BLOOD PRESSURE: 123 MMHG | DIASTOLIC BLOOD PRESSURE: 70 MMHG

## 2025-03-18 DIAGNOSIS — H92.03 OTALGIA OF BOTH EARS: ICD-10-CM

## 2025-03-18 DIAGNOSIS — H91.92 HEARING LOSS OF LEFT EAR, UNSPECIFIED HEARING LOSS TYPE: Primary | ICD-10-CM

## 2025-03-18 DIAGNOSIS — H69.92 DYSFUNCTION OF LEFT EUSTACHIAN TUBE: ICD-10-CM

## 2025-03-18 PROCEDURE — 3078F DIAST BP <80 MM HG: CPT | Performed by: FAMILY MEDICINE

## 2025-03-18 PROCEDURE — 99213 OFFICE O/P EST LOW 20 MIN: CPT | Performed by: FAMILY MEDICINE

## 2025-03-18 PROCEDURE — 3074F SYST BP LT 130 MM HG: CPT | Performed by: FAMILY MEDICINE

## 2025-03-18 RX ORDER — FLUTICASONE PROPIONATE 50 MCG
2 SPRAY, SUSPENSION (ML) NASAL DAILY
Qty: 16 G | Refills: 1 | Status: SHIPPED | OUTPATIENT
Start: 2025-03-18 | End: 2026-03-18

## 2025-03-18 ASSESSMENT — PAIN SCALES - GENERAL: PAINLEVEL_OUTOF10: 5

## 2025-03-18 NOTE — PROGRESS NOTES
Subjective   Patient ID: Roberto Pierre is a 54 y.o. male. They present today with a chief complaint of Earache (Left ear x few week popping noise in ear ).    History of Present Illness  HPI    Patient complains of sensation of clogged left ear for about a month and last night he started having sharp pains to the right ear.  He denies recent URI.  He travels a lot.  Patient states he was seen at an urgent care in [place] where he was diagnosed with ear infection.  He was prescribed antibiotics but he did not improve.    Travels frequently, this week he will be traveling internationally, next travel will be 3 weeks after.  He started swimming.  Hearing loss is mild, when asked to quantify, down to about 70% from baseline.      Past Medical History  Allergies as of 03/18/2025    (No Known Allergies)       (Not in a hospital admission)       Past Medical History:   Diagnosis Date    Cirrhosis of liver (Multi)     Diabetes (Multi)     Kidney stone        Past Surgical History:   Procedure Laterality Date    TRIGGER FINGER RELEASE          reports that he has never smoked. He does not have any smokeless tobacco history on file. He reports that he does not currently use alcohol.    Review of Systems  Review of Systems                               Objective    Vitals:    03/18/25 1516   BP: 123/70   BP Location: Right arm   Patient Position: Sitting   Pulse: 80   Resp: 16   Temp: 36.7 °C (98 °F)   SpO2: 96%     No LMP for male patient.    Physical Exam  Constitutional: Vital signs reviewed. Well developed and well nourished. Patient alert and without distress.     Head and Face: Normal, no orbital swelling.     Eyes: Pupils and irises normal. Pink conjunctivae, anicteric sclerae. No conjunctival injection.    Ears, Nose, Mouth and Throat: External inspection of ears: Normal.  No lesions.  Otoscopic exam: Normal.  No lesions.  Nasal Mucosa, septum and turbinates: Normal.  Oropharynx: Normal.    Neck: No neck mass observed.  Supple.    Cardiovascular: Heart rate normal, normal S1 and S2, no gallops, no murmurs and no pericardial rub. Rhythm: Normal. No peripheral edema.    Pulmonary: No respiratory distress. Clear breath sounds.    Neurologic: Cortical function: Normal    Lymphatic: No cervical lymphadenopathy        Procedures    Point of Care Test & Imaging Results from this visit  No results found for this visit on 03/18/25.   No results found.    Diagnostic study results (if any) were reviewed by Jocelynn Smith MD.    Assessment/Plan   Allergies, medications, history, and pertinent labs/EKGs/Imaging reviewed by Jocelynn Smith MD.     Medical Decision Making  Trial on Flonase for eustachian tube dysfunction however I did strongly recommend seeing an ear, nose, throat specialist for formal hearing evaluation and management as needed.  Discussed how to use Flonase effectively.  Symptoms to watch and when to go to ED, such as sudden total hearing loss, discussed with patient.  Patient expressed understanding and agreed.    Orders and Diagnoses  Diagnoses and all orders for this visit:  Hearing loss of left ear, unspecified hearing loss type  -     Referral to ENT; Future  Otalgia of both ears  Dysfunction of left eustachian tube  -     fluticasone (Flonase) 50 mcg/actuation nasal spray; Administer 2 sprays into each nostril once daily. Shake gently. Before first use, prime pump. After use, clean tip and replace cap.      Medical Admin Record      Patient disposition: Home    Electronically signed by Jocelynn Smith MD  4:14 PM

## 2025-03-18 NOTE — PATIENT INSTRUCTIONS
Trial on Flonase for eustachian tube dysfunction however I did strongly recommend seeing an ear, nose, throat specialist for formal hearing evaluation and management as needed.  Discussed how to use Flonase effectively.  Symptoms to watch and when to go to ED, such as sudden total hearing loss, discussed with patient.  Patient expressed understanding and agreed.

## 2025-03-19 DIAGNOSIS — E11.65 TYPE 2 DIABETES MELLITUS WITH HYPERGLYCEMIA, WITHOUT LONG-TERM CURRENT USE OF INSULIN: ICD-10-CM

## 2025-03-19 RX ORDER — TIRZEPATIDE 5 MG/.5ML
5 INJECTION, SOLUTION SUBCUTANEOUS
Qty: 6 ML | Refills: 0 | Status: SHIPPED | OUTPATIENT
Start: 2025-03-23

## 2025-04-03 ENCOUNTER — APPOINTMENT (OUTPATIENT)
Dept: ENDOCRINOLOGY | Facility: CLINIC | Age: 55
End: 2025-04-03
Payer: COMMERCIAL

## 2025-04-29 ENCOUNTER — HOSPITAL ENCOUNTER (EMERGENCY)
Facility: HOSPITAL | Age: 55
Discharge: HOME | End: 2025-04-29
Attending: EMERGENCY MEDICINE
Payer: COMMERCIAL

## 2025-04-29 ENCOUNTER — APPOINTMENT (OUTPATIENT)
Dept: RADIOLOGY | Facility: HOSPITAL | Age: 55
End: 2025-04-29
Payer: COMMERCIAL

## 2025-04-29 ENCOUNTER — OFFICE VISIT (OUTPATIENT)
Dept: URGENT CARE | Age: 55
End: 2025-04-29
Payer: COMMERCIAL

## 2025-04-29 VITALS
RESPIRATION RATE: 16 BRPM | HEIGHT: 66 IN | OXYGEN SATURATION: 99 % | HEART RATE: 75 BPM | WEIGHT: 155 LBS | BODY MASS INDEX: 24.91 KG/M2 | TEMPERATURE: 97.8 F | SYSTOLIC BLOOD PRESSURE: 125 MMHG | DIASTOLIC BLOOD PRESSURE: 67 MMHG

## 2025-04-29 VITALS
DIASTOLIC BLOOD PRESSURE: 69 MMHG | SYSTOLIC BLOOD PRESSURE: 104 MMHG | WEIGHT: 155 LBS | OXYGEN SATURATION: 98 % | BODY MASS INDEX: 25.02 KG/M2 | TEMPERATURE: 97.7 F | RESPIRATION RATE: 18 BRPM | HEART RATE: 87 BPM

## 2025-04-29 DIAGNOSIS — R31.9 HEMATURIA, UNSPECIFIED TYPE: ICD-10-CM

## 2025-04-29 DIAGNOSIS — K52.9 COLITIS: ICD-10-CM

## 2025-04-29 DIAGNOSIS — K62.5 RECTAL BLEEDING: Primary | ICD-10-CM

## 2025-04-29 DIAGNOSIS — K92.2 GASTROINTESTINAL HEMORRHAGE, UNSPECIFIED GASTROINTESTINAL HEMORRHAGE TYPE: Primary | ICD-10-CM

## 2025-04-29 LAB
ALBUMIN SERPL BCP-MCNC: 2.8 G/DL (ref 3.4–5)
ALP SERPL-CCNC: 65 U/L (ref 33–120)
ALT SERPL W P-5'-P-CCNC: 12 U/L (ref 10–52)
ANION GAP BLDV CALCULATED.4IONS-SCNC: 8 MMOL/L (ref 10–25)
ANION GAP SERPL CALC-SCNC: 9 MMOL/L (ref 10–20)
APTT PPP: 34 SECONDS (ref 26–36)
AST SERPL W P-5'-P-CCNC: 25 U/L (ref 9–39)
BASE EXCESS BLDV CALC-SCNC: -1.7 MMOL/L (ref -2–3)
BASOPHILS # BLD AUTO: 0.02 X10*3/UL (ref 0–0.1)
BASOPHILS NFR BLD AUTO: 0.6 %
BILIRUB SERPL-MCNC: 1.5 MG/DL (ref 0–1.2)
BODY TEMPERATURE: 37 DEGREES CELSIUS
BUN SERPL-MCNC: 16 MG/DL (ref 6–23)
CA-I BLDV-SCNC: 1.23 MMOL/L (ref 1.1–1.33)
CALCIUM SERPL-MCNC: 8.4 MG/DL (ref 8.6–10.3)
CARDIAC TROPONIN I PNL SERPL HS: <3 NG/L (ref 0–20)
CARDIAC TROPONIN I PNL SERPL HS: <3 NG/L (ref 0–20)
CHLORIDE BLDV-SCNC: 112 MMOL/L (ref 98–107)
CHLORIDE SERPL-SCNC: 116 MMOL/L (ref 98–107)
CO2 SERPL-SCNC: 20 MMOL/L (ref 21–32)
CREAT SERPL-MCNC: 1.15 MG/DL (ref 0.5–1.3)
EGFRCR SERPLBLD CKD-EPI 2021: 76 ML/MIN/1.73M*2
EOSINOPHIL # BLD AUTO: 0.16 X10*3/UL (ref 0–0.7)
EOSINOPHIL NFR BLD AUTO: 4.9 %
ERYTHROCYTE [DISTWIDTH] IN BLOOD BY AUTOMATED COUNT: 15.5 % (ref 11.5–14.5)
GLUCOSE BLDV-MCNC: 117 MG/DL (ref 74–99)
GLUCOSE SERPL-MCNC: 124 MG/DL (ref 74–99)
HCO3 BLDV-SCNC: 23 MMOL/L (ref 22–26)
HCT VFR BLD AUTO: 36 % (ref 41–52)
HCT VFR BLD EST: 38 % (ref 41–52)
HGB BLD-MCNC: 11.7 G/DL (ref 13.5–17.5)
HGB BLDV-MCNC: 12.8 G/DL (ref 13.5–17.5)
IMM GRANULOCYTES # BLD AUTO: 0.01 X10*3/UL (ref 0–0.7)
IMM GRANULOCYTES NFR BLD AUTO: 0.3 % (ref 0–0.9)
INHALED O2 CONCENTRATION: 21 %
INR PPP: 1.9 (ref 0.9–1.1)
LACTATE BLDV-SCNC: 1.1 MMOL/L (ref 0.4–2)
LYMPHOCYTES # BLD AUTO: 0.62 X10*3/UL (ref 1.2–4.8)
LYMPHOCYTES NFR BLD AUTO: 18.9 %
MAGNESIUM SERPL-MCNC: 1.97 MG/DL (ref 1.6–2.4)
MCH RBC QN AUTO: 30 PG (ref 26–34)
MCHC RBC AUTO-ENTMCNC: 32.5 G/DL (ref 32–36)
MCV RBC AUTO: 92 FL (ref 80–100)
MONOCYTES # BLD AUTO: 0.31 X10*3/UL (ref 0.1–1)
MONOCYTES NFR BLD AUTO: 9.5 %
NEUTROPHILS # BLD AUTO: 2.16 X10*3/UL (ref 1.2–7.7)
NEUTROPHILS NFR BLD AUTO: 65.8 %
NRBC BLD-RTO: 0 /100 WBCS (ref 0–0)
OXYHGB MFR BLDV: 50 % (ref 45–75)
PCO2 BLDV: 38 MM HG (ref 41–51)
PH BLDV: 7.39 PH (ref 7.33–7.43)
PLATELET # BLD AUTO: 45 X10*3/UL (ref 150–450)
PO2 BLDV: 34 MM HG (ref 35–45)
POC APPEARANCE, URINE: CLEAR
POC BILIRUBIN, URINE: NEGATIVE
POC BLOOD, URINE: NEGATIVE
POC COLOR, URINE: YELLOW
POC GLUCOSE, URINE: ABNORMAL MG/DL
POC KETONES, URINE: NEGATIVE MG/DL
POC LEUKOCYTES, URINE: NEGATIVE
POC NITRITE,URINE: NEGATIVE
POC PH, URINE: 6 PH
POC PROTEIN, URINE: NEGATIVE MG/DL
POC SPECIFIC GRAVITY, URINE: 1.02
POC UROBILINOGEN, URINE: 0.2 EU/DL
POTASSIUM BLDV-SCNC: 3.9 MMOL/L (ref 3.5–5.3)
POTASSIUM SERPL-SCNC: 3.7 MMOL/L (ref 3.5–5.3)
PROT SERPL-MCNC: 5.9 G/DL (ref 6.4–8.2)
PROTHROMBIN TIME: 21.3 SECONDS (ref 9.8–12.4)
RBC # BLD AUTO: 3.9 X10*6/UL (ref 4.5–5.9)
SAO2 % BLDV: 51 % (ref 45–75)
SODIUM BLDV-SCNC: 139 MMOL/L (ref 136–145)
SODIUM SERPL-SCNC: 141 MMOL/L (ref 136–145)
WBC # BLD AUTO: 3.3 X10*3/UL (ref 4.4–11.3)

## 2025-04-29 PROCEDURE — 85025 COMPLETE CBC W/AUTO DIFF WBC: CPT | Performed by: EMERGENCY MEDICINE

## 2025-04-29 PROCEDURE — 3078F DIAST BP <80 MM HG: CPT | Performed by: FAMILY MEDICINE

## 2025-04-29 PROCEDURE — 36415 COLL VENOUS BLD VENIPUNCTURE: CPT | Performed by: EMERGENCY MEDICINE

## 2025-04-29 PROCEDURE — 85730 THROMBOPLASTIN TIME PARTIAL: CPT | Performed by: EMERGENCY MEDICINE

## 2025-04-29 PROCEDURE — 82435 ASSAY OF BLOOD CHLORIDE: CPT | Performed by: EMERGENCY MEDICINE

## 2025-04-29 PROCEDURE — 85610 PROTHROMBIN TIME: CPT | Performed by: EMERGENCY MEDICINE

## 2025-04-29 PROCEDURE — 84484 ASSAY OF TROPONIN QUANT: CPT | Performed by: EMERGENCY MEDICINE

## 2025-04-29 PROCEDURE — 2550000001 HC RX 255 CONTRASTS: Performed by: EMERGENCY MEDICINE

## 2025-04-29 PROCEDURE — 83735 ASSAY OF MAGNESIUM: CPT | Performed by: EMERGENCY MEDICINE

## 2025-04-29 PROCEDURE — 99285 EMERGENCY DEPT VISIT HI MDM: CPT | Mod: 25 | Performed by: EMERGENCY MEDICINE

## 2025-04-29 PROCEDURE — 74174 CTA ABD&PLVS W/CONTRAST: CPT | Mod: FOREIGN READ | Performed by: RADIOLOGY

## 2025-04-29 PROCEDURE — 81003 URINALYSIS AUTO W/O SCOPE: CPT | Performed by: FAMILY MEDICINE

## 2025-04-29 PROCEDURE — 74174 CTA ABD&PLVS W/CONTRAST: CPT

## 2025-04-29 PROCEDURE — 80053 COMPREHEN METABOLIC PANEL: CPT | Performed by: EMERGENCY MEDICINE

## 2025-04-29 PROCEDURE — 99214 OFFICE O/P EST MOD 30 MIN: CPT | Performed by: FAMILY MEDICINE

## 2025-04-29 PROCEDURE — 3074F SYST BP LT 130 MM HG: CPT | Performed by: FAMILY MEDICINE

## 2025-04-29 RX ORDER — METRONIDAZOLE 500 MG/1
500 TABLET ORAL 3 TIMES DAILY
Qty: 30 TABLET | Refills: 0 | Status: SHIPPED | OUTPATIENT
Start: 2025-04-29 | End: 2025-05-09

## 2025-04-29 RX ORDER — LEVOFLOXACIN 500 MG/1
500 TABLET, FILM COATED ORAL DAILY
Qty: 10 TABLET | Refills: 0 | Status: SHIPPED | OUTPATIENT
Start: 2025-04-29 | End: 2025-05-09

## 2025-04-29 RX ORDER — ONDANSETRON 4 MG/1
4 TABLET, FILM COATED ORAL EVERY 6 HOURS
Qty: 12 TABLET | Refills: 0 | Status: SHIPPED | OUTPATIENT
Start: 2025-04-29 | End: 2025-05-02

## 2025-04-29 RX ADMIN — IOHEXOL 90 ML: 350 INJECTION, SOLUTION INTRAVENOUS at 11:54

## 2025-04-29 ASSESSMENT — PAIN SCALES - GENERAL
PAINLEVEL_OUTOF10: 0 - NO PAIN
PAINLEVEL_OUTOF10: 0-NO PAIN
PAINLEVEL_OUTOF10: 0 - NO PAIN

## 2025-04-29 ASSESSMENT — PAIN - FUNCTIONAL ASSESSMENT: PAIN_FUNCTIONAL_ASSESSMENT: 0-10

## 2025-04-29 ASSESSMENT — COLUMBIA-SUICIDE SEVERITY RATING SCALE - C-SSRS
2. HAVE YOU ACTUALLY HAD ANY THOUGHTS OF KILLING YOURSELF?: NO
1. IN THE PAST MONTH, HAVE YOU WISHED YOU WERE DEAD OR WISHED YOU COULD GO TO SLEEP AND NOT WAKE UP?: NO
6. HAVE YOU EVER DONE ANYTHING, STARTED TO DO ANYTHING, OR PREPARED TO DO ANYTHING TO END YOUR LIFE?: NO

## 2025-04-29 NOTE — PROGRESS NOTES
Subjective   Patient ID: Roberto Pierre is a 54 y.o. male. They present today with a chief complaint of Other (Woke up this morning with a large amount of blood in underwear).    History of Present Illness  HPI    Past Medical History  Allergies as of 04/29/2025    (No Known Allergies)       Prescriptions Prior to Admission[1]     Medical History[2]    Surgical History[3]     reports that he has never smoked. He does not have any smokeless tobacco history on file. He reports that he does not currently use alcohol.    Review of Systems  Review of Systems                               Objective    Vitals:    04/29/25 0836   BP: 104/69   Pulse: 87   Resp: 18   Temp: 36.5 °C (97.7 °F)   SpO2: 98%   Weight: 70.3 kg (155 lb)     No LMP for male patient.    Physical Exam    Procedures    Point of Care Test & Imaging Results from this visit  Results for orders placed or performed in visit on 04/29/25   POCT UA Automated manually resulted   Result Value Ref Range    POC Color, Urine Yellow Straw, Yellow, Light-Yellow    POC Appearance, Urine Clear Clear    POC Glucose, Urine 250 (2+) (A) NEGATIVE mg/dl    POC Bilirubin, Urine NEGATIVE NEGATIVE    POC Ketones, Urine NEGATIVE NEGATIVE mg/dl    POC Specific Gravity, Urine 1.025 1.005 - 1.035    POC Blood, Urine NEGATIVE NEGATIVE    POC PH, Urine 6.0 No Reference Range Established PH    POC Protein, Urine NEGATIVE NEGATIVE mg/dl    POC Urobilinogen, Urine 0.2 0.2, 1.0 EU/DL    Poc Nitrite, Urine NEGATIVE NEGATIVE    POC Leukocytes, Urine NEGATIVE NEGATIVE      Imaging  No results found.    Cardiology, Vascular, and Other Imaging  No other imaging results found for the past 2 days      Diagnostic study results (if any) were reviewed by Jocelynn Smith MD.    Assessment/Plan   Allergies, medications, history, and pertinent labs/EKGs/Imaging reviewed by Jocelynn Smith MD.     Medical Decision Making  Patient woke up with a large amount of blood on the posterior  side of his underwear, no blood anteriorly.  He feels well otherwise.  He brought a photo and it did have a large amount of blood soaked in the underwear.  His vitals are stable.  Patient is referred to the ED for further evaluation and management.  No charge visit.    Urine dipstick was done prior to my evaluation.  No blood in the urine.    Orders and Diagnoses  Diagnoses and all orders for this visit:  Hematuria, unspecified type  -     POCT UA Automated manually resulted      Medical Admin Record      Patient disposition: ED    Electronically signed by Jocelynn Smith MD  9:13 AM           [1] (Not in a hospital admission)  [2]   Past Medical History:  Diagnosis Date    Cirrhosis of liver (Multi)     Diabetes (Multi)     Kidney stone    [3]   Past Surgical History:  Procedure Laterality Date    TRIGGER FINGER RELEASE

## 2025-04-29 NOTE — DISCHARGE INSTRUCTIONS
Please take the Levaquin and Flagyl as prescribed treat for your colitis.  Please use Zofran as prescribed to help with nausea and vomiting if you have it.  Please follow-up with your primary care physician in the next 3 to 5 days repeat exam to ensure improvement in symptoms.  Please return to ED for worsening pain, fever, vomiting, inability to drink or any other concerning symptoms.

## 2025-04-29 NOTE — ED NOTES
Community Resource Name: Patient given a list of  primary care physicians.  Phone Number:   Staff Member:  Emely Lopez    Discussed the following topics on behalf of the patient:  []  Behavioral Health Assistance     []  Case Management  []   Assistance  []  Digital Equity Assistance  []  Dental Health Assistance  []  Education Assistance  []  Employment Assistance  []  Financial Strain Relief Assistance  []  Food Insecurity Assistance  [x]  Healthcare Coverage Assistance  []  Housing Stability Assistance  []  IP Violence Relief Assistance  []  Legal Assistance  []  Physical Activity Assistance  []  Social Connection Assistance  []  Stress Relief Assistance   []  Substance Abuse Assistance  []  Transportation Assistance  []  Utility Assistance  [x]  Other: [insert comment here]  Patient doesn't have PCP.  Next Steps:         LENIN Kim  Track patients for community healthcare services. CHW talked to patient regarding not having a primary care physician. CHW asked the patient if he would be interested in looking at a list of  physicians to choose from for future services. Patient agreed and was given a list of  physicians, they are accepting new patients, they are taking his insurance([employer]), and they are in the area where he lives. Patient expressed appreciation.        LENIN Kim  04/29/25 6899

## 2025-04-29 NOTE — ED TRIAGE NOTES
Pt states he woke up with large amount of dried blood in underwear. Pt. Does not take blood thinners.

## 2025-04-29 NOTE — ED PROVIDER NOTES
HPI   Chief Complaint   Patient presents with    Black or Bloody Stool       This is a 54-year-old man otherwise healthy does present with episode of painless rectal bleeding.  Of episode of bright red blood in his underwear when he woke up this morning.  Denies any pain.  No prior history reported of hemorrhoids or diverticulosis.  No fever or chills.  No abdominal pain.  No nausea or vomiting.  No travel history.            Patient History   Medical History[1]  Surgical History[2]  Family History[3]  Social History[4]    Physical Exam   ED Triage Vitals [04/29/25 0945]   Temperature Heart Rate Respirations BP   36.6 °C (97.9 °F) 84 18 119/59      Pulse Ox Temp src Heart Rate Source Patient Position   99 % -- -- --      BP Location FiO2 (%)     -- --       Physical Exam  Vitals and nursing note reviewed.   Constitutional:       General: He is not in acute distress.     Appearance: Normal appearance. He is normal weight. He is not ill-appearing.   HENT:      Head: Normocephalic and atraumatic.      Nose: Nose normal.      Mouth/Throat:      Mouth: Mucous membranes are moist.      Pharynx: Oropharynx is clear.   Eyes:      Conjunctiva/sclera: Conjunctivae normal.      Pupils: Pupils are equal, round, and reactive to light.   Cardiovascular:      Rate and Rhythm: Normal rate and regular rhythm.      Pulses: Normal pulses.      Heart sounds: Normal heart sounds.   Pulmonary:      Effort: Pulmonary effort is normal.      Breath sounds: Normal breath sounds.   Abdominal:      General: Abdomen is flat. Bowel sounds are normal. There is no distension.      Palpations: Abdomen is soft. There is no mass.      Tenderness: There is no abdominal tenderness. There is no right CVA tenderness or guarding.      Hernia: No hernia is present.   Musculoskeletal:         General: No tenderness or deformity. Normal range of motion.      Cervical back: Normal range of motion and neck supple.   Skin:     General: Skin is warm and dry.       Capillary Refill: Capillary refill takes less than 2 seconds.      Coloration: Skin is not pale.      Findings: No bruising.   Neurological:      General: No focal deficit present.      Mental Status: He is alert and oriented to person, place, and time. Mental status is at baseline.      Sensory: No sensory deficit.      Motor: No weakness.   Psychiatric:         Mood and Affect: Mood normal.         Behavior: Behavior normal.         Thought Content: Thought content normal.         Judgment: Judgment normal.           ED Course & MDM   Diagnoses as of 04/29/25 1345   Gastrointestinal hemorrhage, unspecified gastrointestinal hemorrhage type   Colitis                 No data recorded     [place] Coma Scale Score: 15 (04/29/25 1246 : Anne-Marie Khalil RN)                           Medical Decision Making  IV is placed and labs are drawn including CBC and CMP.  There was no elevated white cell count or stomach infection.Hemoglobin stable at 11.7 and there was no acute kidney injury.  No metabolic anion gap acidosis.  No electrolyte abnormalities.  No troponin elevation for ACS.  No elevated lactate for sepsis.  His abdominal exam is fairly benign.  Given bleeding noted he was sent for CT angio GI bleed protocol.  The CT angio for GI bleed was negative for acute pathology.  It showed possible colitis.  Will treat patient with Levaquin and Flagyl, Zofran as needed and follow-up with primary care physician.  Return precautions are discussed.    Amount and/or Complexity of Data Reviewed  Labs: ordered. Decision-making details documented in ED Course.  Radiology: ordered. Decision-making details documented in ED Course.        Procedure  Procedures       [1]   Past Medical History:  Diagnosis Date    Cirrhosis of liver (Multi)     Diabetes (Multi)     Kidney stone    [2]   Past Surgical History:  Procedure Laterality Date    TRIGGER FINGER RELEASE     [3]   Family History  Problem Relation Name Age of Onset    Cirrhosis  Mother      Breast cancer Sister      Sleep apnea Brother     [4]   Social History  Tobacco Use    Smoking status: Never    Smokeless tobacco: Not on file   Vaping Use    Vaping status: Never Used   Substance Use Topics    Alcohol use: Not Currently    Drug use: Not on file        Clement Rios MD  04/29/25 5782

## 2025-05-09 ENCOUNTER — APPOINTMENT (OUTPATIENT)
Dept: CARDIOLOGY | Facility: HOSPITAL | Age: 55
End: 2025-05-09
Payer: COMMERCIAL

## 2025-05-09 ENCOUNTER — APPOINTMENT (OUTPATIENT)
Dept: RADIOLOGY | Facility: HOSPITAL | Age: 55
End: 2025-05-09
Payer: COMMERCIAL

## 2025-05-09 VITALS
BODY MASS INDEX: 25.23 KG/M2 | DIASTOLIC BLOOD PRESSURE: 57 MMHG | OXYGEN SATURATION: 100 % | TEMPERATURE: 97.9 F | RESPIRATION RATE: 18 BRPM | HEIGHT: 66 IN | WEIGHT: 157 LBS | HEART RATE: 77 BPM | SYSTOLIC BLOOD PRESSURE: 117 MMHG

## 2025-05-09 LAB
ALBUMIN SERPL BCP-MCNC: 3.2 G/DL (ref 3.4–5)
ALP SERPL-CCNC: 64 U/L (ref 33–120)
ALT SERPL W P-5'-P-CCNC: 14 U/L (ref 10–52)
ANION GAP SERPL CALC-SCNC: 10 MMOL/L (ref 10–20)
APPEARANCE UR: CLEAR
AST SERPL W P-5'-P-CCNC: 34 U/L (ref 9–39)
BASOPHILS # BLD AUTO: 0.01 X10*3/UL (ref 0–0.1)
BASOPHILS NFR BLD AUTO: 0.3 %
BILIRUB DIRECT SERPL-MCNC: 0.5 MG/DL (ref 0–0.3)
BILIRUB SERPL-MCNC: 1.7 MG/DL (ref 0–1.2)
BILIRUB UR STRIP.AUTO-MCNC: NEGATIVE MG/DL
BNP SERPL-MCNC: 21 PG/ML (ref 0–99)
BUN SERPL-MCNC: 14 MG/DL (ref 6–23)
CALCIUM SERPL-MCNC: 8 MG/DL (ref 8.6–10.3)
CHLORIDE SERPL-SCNC: 114 MMOL/L (ref 98–107)
CO2 SERPL-SCNC: 22 MMOL/L (ref 21–32)
COLOR UR: YELLOW
CREAT SERPL-MCNC: 1.22 MG/DL (ref 0.5–1.3)
EGFRCR SERPLBLD CKD-EPI 2021: 70 ML/MIN/1.73M*2
EOSINOPHIL # BLD AUTO: 0.14 X10*3/UL (ref 0–0.7)
EOSINOPHIL NFR BLD AUTO: 3.9 %
ERYTHROCYTE [DISTWIDTH] IN BLOOD BY AUTOMATED COUNT: 15.9 % (ref 11.5–14.5)
GLUCOSE SERPL-MCNC: 88 MG/DL (ref 74–99)
GLUCOSE UR STRIP.AUTO-MCNC: ABNORMAL MG/DL
HCT VFR BLD AUTO: 39.4 % (ref 41–52)
HGB BLD-MCNC: 13 G/DL (ref 13.5–17.5)
IMM GRANULOCYTES # BLD AUTO: 0.01 X10*3/UL (ref 0–0.7)
IMM GRANULOCYTES NFR BLD AUTO: 0.3 % (ref 0–0.9)
KETONES UR STRIP.AUTO-MCNC: ABNORMAL MG/DL
LEUKOCYTE ESTERASE UR QL STRIP.AUTO: ABNORMAL
LIPASE SERPL-CCNC: 116 U/L (ref 9–82)
LYMPHOCYTES # BLD AUTO: 0.78 X10*3/UL (ref 1.2–4.8)
LYMPHOCYTES NFR BLD AUTO: 21.8 %
MAGNESIUM SERPL-MCNC: 1.95 MG/DL (ref 1.6–2.4)
MCH RBC QN AUTO: 29.9 PG (ref 26–34)
MCHC RBC AUTO-ENTMCNC: 33 G/DL (ref 32–36)
MCV RBC AUTO: 91 FL (ref 80–100)
MONOCYTES # BLD AUTO: 0.38 X10*3/UL (ref 0.1–1)
MONOCYTES NFR BLD AUTO: 10.6 %
MUCOUS THREADS #/AREA URNS AUTO: NORMAL /LPF
NEUTROPHILS # BLD AUTO: 2.25 X10*3/UL (ref 1.2–7.7)
NEUTROPHILS NFR BLD AUTO: 63.1 %
NITRITE UR QL STRIP.AUTO: NEGATIVE
NRBC BLD-RTO: 0 /100 WBCS (ref 0–0)
PH UR STRIP.AUTO: 5 [PH]
PLATELET # BLD AUTO: 52 X10*3/UL (ref 150–450)
POTASSIUM SERPL-SCNC: 4 MMOL/L (ref 3.5–5.3)
PROT SERPL-MCNC: 5.9 G/DL (ref 6.4–8.2)
PROT UR STRIP.AUTO-MCNC: NEGATIVE MG/DL
RBC # BLD AUTO: 4.35 X10*6/UL (ref 4.5–5.9)
RBC # UR STRIP.AUTO: NEGATIVE MG/DL
RBC #/AREA URNS AUTO: NORMAL /HPF
SODIUM SERPL-SCNC: 142 MMOL/L (ref 136–145)
SP GR UR STRIP.AUTO: >1.03
SQUAMOUS #/AREA URNS AUTO: NORMAL /HPF
UROBILINOGEN UR STRIP.AUTO-MCNC: NORMAL MG/DL
WBC # BLD AUTO: 3.6 X10*3/UL (ref 4.4–11.3)
WBC #/AREA URNS AUTO: NORMAL /HPF

## 2025-05-09 PROCEDURE — 36415 COLL VENOUS BLD VENIPUNCTURE: CPT | Performed by: PHYSICIAN ASSISTANT

## 2025-05-09 PROCEDURE — 81001 URINALYSIS AUTO W/SCOPE: CPT | Performed by: PHYSICIAN ASSISTANT

## 2025-05-09 PROCEDURE — 87086 URINE CULTURE/COLONY COUNT: CPT | Mod: AHULAB | Performed by: PHYSICIAN ASSISTANT

## 2025-05-09 PROCEDURE — 85025 COMPLETE CBC W/AUTO DIFF WBC: CPT | Performed by: PHYSICIAN ASSISTANT

## 2025-05-09 PROCEDURE — 93005 ELECTROCARDIOGRAM TRACING: CPT

## 2025-05-09 PROCEDURE — 80053 COMPREHEN METABOLIC PANEL: CPT | Performed by: PHYSICIAN ASSISTANT

## 2025-05-09 PROCEDURE — 83735 ASSAY OF MAGNESIUM: CPT | Performed by: PHYSICIAN ASSISTANT

## 2025-05-09 PROCEDURE — 83690 ASSAY OF LIPASE: CPT | Performed by: PHYSICIAN ASSISTANT

## 2025-05-09 PROCEDURE — 93970 EXTREMITY STUDY: CPT

## 2025-05-09 PROCEDURE — 83880 ASSAY OF NATRIURETIC PEPTIDE: CPT | Performed by: PHYSICIAN ASSISTANT

## 2025-05-09 PROCEDURE — 99284 EMERGENCY DEPT VISIT MOD MDM: CPT | Mod: 25 | Performed by: EMERGENCY MEDICINE

## 2025-05-09 PROCEDURE — 99285 EMERGENCY DEPT VISIT HI MDM: CPT | Mod: 25

## 2025-05-09 PROCEDURE — 93970 EXTREMITY STUDY: CPT | Performed by: RADIOLOGY

## 2025-05-09 ASSESSMENT — COLUMBIA-SUICIDE SEVERITY RATING SCALE - C-SSRS
2. HAVE YOU ACTUALLY HAD ANY THOUGHTS OF KILLING YOURSELF?: NO
6. HAVE YOU EVER DONE ANYTHING, STARTED TO DO ANYTHING, OR PREPARED TO DO ANYTHING TO END YOUR LIFE?: NO
1. IN THE PAST MONTH, HAVE YOU WISHED YOU WERE DEAD OR WISHED YOU COULD GO TO SLEEP AND NOT WAKE UP?: NO

## 2025-05-09 ASSESSMENT — PAIN SCALES - GENERAL: PAINLEVEL_OUTOF10: 0 - NO PAIN

## 2025-05-09 ASSESSMENT — PAIN - FUNCTIONAL ASSESSMENT: PAIN_FUNCTIONAL_ASSESSMENT: 0-10

## 2025-05-09 NOTE — ED TRIAGE NOTES
TRIAGE NOTE   I saw the patient as the Clinician in Triage and performed a brief history and physical exam, established acuity, and ordered appropriate tests to develop basic plan of care. Patient will be seen by an [facility], resident and/or physician who will independently evaluate the patient. Please see subsequent provider notes for further details and disposition.     Brief HPI: In brief, Roberto Pierre is a 54 y.o. male that presents for bilateral lower leg swelling onset last night.  History of liver cirrhosis.  Reports 1-1/2 to 2 pound weight gain since yesterday.  No chest pain shortness of breath dyspnea on exertion.  No pleuritic pain.  No cough or hemoptysis.  No history of kidney disease.  No history of heart disease or CHF.  No history of DVT.  Recently hospitalized for colitis currently on antibiotics.  Colitis symptoms have improved.  No further bloody stool..     Focused Physical exam:   Vital signs are stable.  Afebrile.  No tachycardia or tachypnea.  No respiratory difficulty.  No increased work of breathing.  Pulse ox 100%.  Alert and oriented normal mental status.  Ambulating independently with steady gait.  Symmetrical bilateral ankle lower leg swelling +1.  No erythema induration pain tenderness or crepitus.  No pain with ambulation.    Plan/MDM:   Workup initiated.  Stable pending bed availability and further evaluation.  Please see subsequent provider note for further details and disposition

## 2025-05-10 ENCOUNTER — HOSPITAL ENCOUNTER (EMERGENCY)
Facility: HOSPITAL | Age: 55
Discharge: HOME | End: 2025-05-10
Attending: EMERGENCY MEDICINE
Payer: COMMERCIAL

## 2025-05-10 DIAGNOSIS — R60.0 PERIPHERAL EDEMA: Primary | ICD-10-CM

## 2025-05-10 LAB — HOLD SPECIMEN: NORMAL

## 2025-05-10 NOTE — ED PROVIDER NOTES
HPI   Chief Complaint   Patient presents with    Leg Swelling       Patient has history of cirrhosis and was diagnosed with colitis.  He is finishing up his antibiotics today.  He was also concerned that he might have some extracolonic manifestations of inflammatory bowel disease with leg swelling that he noticed worsening today.  Denies shortness of breath.  The cirrhosis he has no history of ascites.  He has never had to be drained.  Denies a history of heart failure or kidney failure.  Denies any new fever, cough, Robe diarrhea constipation.  No further blood in his stool.  He has had varices that are banded but again no further blood in the stool.              Patient History   Medical History[1]  Surgical History[2]  Family History[3]  Social History[4]    Physical Exam   ED Triage Vitals   Temperature Heart Rate Respirations BP   05/09/25 1734 05/09/25 1734 05/09/25 1734 05/09/25 1734   36.4 °C (97.5 °F) 81 18 117/57      Pulse Ox Temp Source Heart Rate Source Patient Position   05/09/25 1734 05/09/25 1734 05/09/25 2000 --   100 % Temporal Monitor       BP Location FiO2 (%)     -- --             Physical Exam  Vitals and nursing note reviewed.   Constitutional:       General: He is not in acute distress.     Appearance: He is well-developed.   HENT:      Head: Normocephalic and atraumatic.      Nose: No rhinorrhea.      Mouth/Throat:      Mouth: Mucous membranes are moist.   Eyes:      Conjunctiva/sclera: Conjunctivae normal.   Pulmonary:      Effort: Pulmonary effort is normal. No respiratory distress.   Abdominal:      Palpations: Abdomen is soft.      Tenderness: There is no abdominal tenderness.   Musculoskeletal:         General: No swelling.      Cervical back: Neck supple.      Right lower leg: Edema present.      Left lower leg: Edema present.   Skin:     General: Skin is warm.      Capillary Refill: Capillary refill takes less than 2 seconds.   Neurological:      General: No focal deficit present.       Mental Status: He is alert.   Psychiatric:         Mood and Affect: Mood normal.           ED Course & MDM   Diagnoses as of 05/10/25 0259   Peripheral edema                 No data recorded     [place] Coma Scale Score: 15 (05/09/25 1735 : Avelino Brooks RN)                           Medical Decision Making  The patient has 1+ edema.  The patient has no DVT on ultrasound.  BNP kidney function liver function were unremarkable as well.  No signs of infection.  Feel he can discharge home follow-up with his PCP and GI    Procedure  Procedures       [1]   Past Medical History:  Diagnosis Date    Cirrhosis of liver (Multi)     Diabetes (Multi)     Kidney stone    [2]   Past Surgical History:  Procedure Laterality Date    TRIGGER FINGER RELEASE     [3]   Family History  Problem Relation Name Age of Onset    Cirrhosis Mother      Breast cancer Sister      Sleep apnea Brother     [4]   Social History  Tobacco Use    Smoking status: Never    Smokeless tobacco: Not on file   Vaping Use    Vaping status: Never Used   Substance Use Topics    Alcohol use: Not Currently    Drug use: Not on file        Vinicius Leiva MD  05/10/25 1167

## 2025-05-11 LAB — BACTERIA UR CULT: NO GROWTH

## 2025-05-12 LAB
ATRIAL RATE: 74 BPM
P AXIS: 65 DEGREES
P OFFSET: 191 MS
P ONSET: 131 MS
PR INTERVAL: 172 MS
Q ONSET: 217 MS
QRS COUNT: 12 BEATS
QRS DURATION: 88 MS
QT INTERVAL: 390 MS
QTC CALCULATION(BAZETT): 432 MS
QTC FREDERICIA: 418 MS
R AXIS: 47 DEGREES
T AXIS: 48 DEGREES
T OFFSET: 412 MS
VENTRICULAR RATE: 74 BPM

## 2025-05-15 ENCOUNTER — TELEPHONE (OUTPATIENT)
Dept: GASTROENTEROLOGY | Facility: HOSPITAL | Age: 55
End: 2025-05-15
Payer: COMMERCIAL

## 2025-05-15 DIAGNOSIS — K74.60 CIRRHOSIS OF LIVER WITHOUT ASCITES, UNSPECIFIED HEPATIC CIRRHOSIS TYPE (MULTI): ICD-10-CM

## 2025-05-15 DIAGNOSIS — K76.0 METABOLIC DYSFUNCTION-ASSOCIATED STEATOTIC LIVER DISEASE (MASLD): ICD-10-CM

## 2025-05-16 NOTE — TELEPHONE ENCOUNTER
Patient was returning Dr. Feldman's call regarding recent hospital visit.     Best contact is [phone]

## 2025-05-22 NOTE — TELEPHONE ENCOUNTER
Hepatology Nurse Coordinator Note   Spoke with patient. He states he scheduled for Dr. Feldman's first available appointment in July. He was made aware he needs to get lab work and a Liver Ultrasound now. He is requesting for scheduling number to be sent via [facility] for ultrasound. Patient verbalized understanding.

## 2025-06-06 ENCOUNTER — APPOINTMENT (OUTPATIENT)
Dept: ENDOCRINOLOGY | Facility: CLINIC | Age: 55
End: 2025-06-06
Payer: COMMERCIAL

## 2025-06-06 ENCOUNTER — HOSPITAL ENCOUNTER (OUTPATIENT)
Dept: RADIOLOGY | Facility: HOSPITAL | Age: 55
Discharge: HOME | End: 2025-06-06
Payer: COMMERCIAL

## 2025-06-06 VITALS
TEMPERATURE: 97.6 F | SYSTOLIC BLOOD PRESSURE: 103 MMHG | HEART RATE: 84 BPM | DIASTOLIC BLOOD PRESSURE: 64 MMHG | HEIGHT: 66 IN | BODY MASS INDEX: 25.39 KG/M2 | WEIGHT: 158 LBS

## 2025-06-06 DIAGNOSIS — E11.65 TYPE 2 DIABETES MELLITUS WITH HYPERGLYCEMIA, WITHOUT LONG-TERM CURRENT USE OF INSULIN: ICD-10-CM

## 2025-06-06 DIAGNOSIS — K74.60 CIRRHOSIS OF LIVER WITHOUT ASCITES, UNSPECIFIED HEPATIC CIRRHOSIS TYPE (MULTI): ICD-10-CM

## 2025-06-06 DIAGNOSIS — K76.0 METABOLIC DYSFUNCTION-ASSOCIATED STEATOTIC LIVER DISEASE (MASLD): ICD-10-CM

## 2025-06-06 DIAGNOSIS — E11.649 TYPE 2 DIABETES MELLITUS WITH HYPOGLYCEMIA WITHOUT COMA, UNSPECIFIED WHETHER LONG TERM INSULIN USE: Primary | ICD-10-CM

## 2025-06-06 LAB — POC HEMOGLOBIN A1C: 4.6 % (ref 4.2–6.5)

## 2025-06-06 PROCEDURE — 76705 ECHO EXAM OF ABDOMEN: CPT

## 2025-06-06 PROCEDURE — 3074F SYST BP LT 130 MM HG: CPT | Performed by: INTERNAL MEDICINE

## 2025-06-06 PROCEDURE — 76705 ECHO EXAM OF ABDOMEN: CPT | Performed by: STUDENT IN AN ORGANIZED HEALTH CARE EDUCATION/TRAINING PROGRAM

## 2025-06-06 PROCEDURE — 83036 HEMOGLOBIN GLYCOSYLATED A1C: CPT | Performed by: INTERNAL MEDICINE

## 2025-06-06 PROCEDURE — 99214 OFFICE O/P EST MOD 30 MIN: CPT | Performed by: INTERNAL MEDICINE

## 2025-06-06 PROCEDURE — 3008F BODY MASS INDEX DOCD: CPT | Performed by: INTERNAL MEDICINE

## 2025-06-06 PROCEDURE — 3044F HG A1C LEVEL LT 7.0%: CPT | Performed by: INTERNAL MEDICINE

## 2025-06-06 PROCEDURE — 3078F DIAST BP <80 MM HG: CPT | Performed by: INTERNAL MEDICINE

## 2025-06-06 RX ORDER — TIRZEPATIDE 5 MG/.5ML
5 INJECTION, SOLUTION SUBCUTANEOUS
Qty: 6 ML | Refills: 0 | Status: SHIPPED | OUTPATIENT
Start: 2025-06-08

## 2025-06-06 ASSESSMENT — PATIENT HEALTH QUESTIONNAIRE - PHQ9
2. FEELING DOWN, DEPRESSED OR HOPELESS: NOT AT ALL
SUM OF ALL RESPONSES TO PHQ9 QUESTIONS 1 AND 2: 0
1. LITTLE INTEREST OR PLEASURE IN DOING THINGS: NOT AT ALL

## 2025-06-06 ASSESSMENT — ENCOUNTER SYMPTOMS
DEPRESSION: 0
LOSS OF SENSATION IN FEET: 0
OCCASIONAL FEELINGS OF UNSTEADINESS: 0

## 2025-06-06 NOTE — PROGRESS NOTES
Patient is seen at the request of SELF for my opinion regarding diabetes. My final recommendations will be communicated back to the requesting provider by way of shared medical record.    Subjective   Roberto Pierre is a 54 y.o. male with a hx of type 2 diabetes, liver cirrhosis, kidney stones, who presents for management of diabetes.  Will see Hepatologist 07/17/25. Colonoscopy will be discussed then. Had a completely asymptomatic episode of alicia bleeding per rectum in April, hemodynamically stable d/c home for f/u with GI.    DM2  Dx: approx 2012  HbA1c:4.6% (06/06/2025) 6.0% (10/3/2024), 5.8% (03/2024)    Current regimen:   Jardiance 25 mg QDAY,   Mounjaro 5 mg/week    Past medications:   Trulicity 3mg/week    Complications: none    SMBG: does have a Malvin but has not been using    Hypoglycemia awareness: yes     Diet: 3 meals a day  Small breakfast (half a bagel or protein bar)  Lunch: sandwich  Dinner: left overs, frozen burritos    Exercise: not regularly    Today:  - Weight loss maintained. Has started working out more regularly and has been able to loose an extra 10lb.    ROS  General: no fever or chills  CV: no chest pain   Respiratory: no shortness of breath  MSK: no lower extremity edema  Neuro: no headache or dizziness  See HPI for Endocrine ROS    Past Medical History:   Diagnosis Date    Cirrhosis of liver (Multi)     Diabetes (Multi)     Kidney stone        Past Surgical History:   Procedure Laterality Date    TRIGGER FINGER RELEASE         Social History     Socioeconomic History    Marital status: Unknown     Spouse name: Not on file    Number of children: Not on file    Years of education: Not on file    Highest education level: Not on file   Occupational History    Not on file   Tobacco Use    Smoking status: Never    Smokeless tobacco: Not on file   Vaping Use    Vaping status: Never Used   Substance and Sexual Activity    Alcohol use: Not Currently    Drug use: Not on file    Sexual activity: Not on  "file   Other Topics Concern    Not on file   Social History Narrative    Not on file     Social Drivers of Health     Financial Resource Strain: Not on file   Food Insecurity: Not on file   Transportation Needs: Not on file   Physical Activity: Not on file   Stress: Not on file   Social Connections: Not on file   Intimate Partner Violence: Not on file   Housing Stability: Not on file       Objective    Physical Exam  Blood pressure 103/64, pulse 84, temperature 36.4 °C (97.6 °F), temperature source Temporal, height 1.676 m (5' 6\"), weight 71.7 kg (158 lb).  General: not in acute distress  HEENT: KAVIN, EOMI  Thyroid: no goiter  Neuro: alert and oriented x 3  DIABETIC FOOT EXAM: 2+ pedal pulses bilaterally, 10/10 monofilament bilaterally, no open wounds or blisters    Current Outpatient Medications:     carvedilol (Coreg) 6.25 mg tablet, Take 1 tablet (6.25 mg) by mouth 2 times daily (morning and late afternoon). Take 1 tablet daily for 1 week. Then increase to twice daily., Disp: 60 tablet, Rfl: 11    fenofibrate (Tricor) 145 mg tablet, Take 1 tablet (145 mg) by mouth once daily., Disp: 90 tablet, Rfl: 3    fluticasone (Flonase) 50 mcg/actuation nasal spray, Administer 2 sprays into each nostril once daily. Shake gently. Before first use, prime pump. After use, clean tip and replace cap., Disp: 16 g, Rfl: 1    Jardiance 25 mg, Take 1 tablet (25 mg) by mouth once daily., Disp: 90 tablet, Rfl: 3    OneTouch Delica Plus Lancet 33 gauge misc, Check blood glucose once daily, Disp: 100 each, Rfl: 3    OneTouch Ultra Test strip, Check blood glucose once daily, Disp: 100 strip, Rfl: 3    OneTouch Ultra2 Meter misc, Check blood glucose once a day, Disp: 1 each, Rfl: 0    pantoprazole (ProtoNix) 40 mg EC tablet, TAKE 1 TAB BY MOUTH EVERY MORNING, Disp: , Rfl:     tirzepatide (Mounjaro) 5 mg/0.5 mL pen injector, Inject 5 mg under the skin 1 (one) time per week. *Please keep appointment on 4/3 for further refills*, Disp: 6 mL, " Rfl: 0    Assessment/Plan   Roberto Pierre is a 54 y.o. male with a hx of type 2 diabetes, liver cirrosis, kidney stones who presents for management of diabetes.    Type 2 diabetes mellitus  HbA1c: 4.6%(06/06/2025 6.0% (10/30/2024), 5.8% (03/2024)  Current regimen: Jardiance 25 mg QDAY, Mounjaro 5mg weekly  Eye exam: LV Feb, no reports of MD retinopathy.  Urine microalbumin: no microalbuminuria, due now.  Podiatry: Selfcare  Lipids: HDL 53, LDL 69, TG 43 (03/2024) not on statin, due for repeat.    A1c:  4.6% today. Excellent glycemic control. A1C <6 likely related to recent episode of bleeding per rectum, likely underestimating actual A1C. Checking FSBG sporadically.    PLAN:  - Continue Mounjaro 5 mg, once a week  - Decrease dose of Jardiance from 25 mg QDAY to 10 mg PO QDAY.  - Check blood glucose 4-5 times per week (alternate fasting and 2h post dinner)   - Check urine microalbumin and lipid panel.  - Refills provided.    Follow up in 6 months

## 2025-07-01 DIAGNOSIS — K76.6 PORTAL HYPERTENSION (MULTI): ICD-10-CM

## 2025-07-01 DIAGNOSIS — K76.0 METABOLIC DYSFUNCTION-ASSOCIATED STEATOTIC LIVER DISEASE (MASLD): ICD-10-CM

## 2025-07-01 DIAGNOSIS — K74.60 CIRRHOSIS OF LIVER WITHOUT ASCITES, UNSPECIFIED HEPATIC CIRRHOSIS TYPE (MULTI): ICD-10-CM

## 2025-07-01 RX ORDER — CARVEDILOL 6.25 MG/1
TABLET ORAL
Qty: 180 TABLET | Refills: 3 | Status: SHIPPED | OUTPATIENT
Start: 2025-07-01

## 2025-07-02 DIAGNOSIS — K21.9 GASTROESOPHAGEAL REFLUX DISEASE WITHOUT ESOPHAGITIS: Primary | ICD-10-CM

## 2025-07-02 NOTE — TELEPHONE ENCOUNTER
Hepatology Nurse Coordinator Note - Prescription Refill Request  Medication Requested: Pantoprazole   Last Office Visit with Dr. Feldman: 9/5/2024  Upcoming Office Visit with Dr. Feldman: 7/17/2025  Pharmacy: [facility]  Will forward request to Dr. Feldman. Dr. Feldman had notified RN he is okay with refilling. Will provide update to patient via response to his [employer] message.

## 2025-07-03 RX ORDER — PANTOPRAZOLE SODIUM 40 MG/1
40 TABLET, DELAYED RELEASE ORAL
Qty: 30 TABLET | Refills: 3 | Status: SHIPPED | OUTPATIENT
Start: 2025-07-03

## 2025-07-16 ENCOUNTER — APPOINTMENT (OUTPATIENT)
Dept: GASTROENTEROLOGY | Facility: HOSPITAL | Age: 55
End: 2025-07-16
Payer: COMMERCIAL

## 2025-07-17 ENCOUNTER — APPOINTMENT (OUTPATIENT)
Dept: GASTROENTEROLOGY | Facility: CLINIC | Age: 55
End: 2025-07-17
Payer: COMMERCIAL

## 2025-08-07 ENCOUNTER — APPOINTMENT (OUTPATIENT)
Dept: OPHTHALMOLOGY | Facility: CLINIC | Age: 55
End: 2025-08-07
Payer: COMMERCIAL

## 2025-08-29 DIAGNOSIS — E11.65 TYPE 2 DIABETES MELLITUS WITH HYPERGLYCEMIA, WITHOUT LONG-TERM CURRENT USE OF INSULIN: ICD-10-CM

## 2025-08-29 RX ORDER — TIRZEPATIDE 5 MG/.5ML
5 INJECTION, SOLUTION SUBCUTANEOUS
Qty: 6 ML | Refills: 0 | Status: SHIPPED | OUTPATIENT
Start: 2025-08-31

## 2025-12-16 ENCOUNTER — APPOINTMENT (OUTPATIENT)
Dept: ENDOCRINOLOGY | Facility: CLINIC | Age: 55
End: 2025-12-16
Payer: COMMERCIAL